# Patient Record
Sex: FEMALE | Race: WHITE | NOT HISPANIC OR LATINO | URBAN - METROPOLITAN AREA
[De-identification: names, ages, dates, MRNs, and addresses within clinical notes are randomized per-mention and may not be internally consistent; named-entity substitution may affect disease eponyms.]

---

## 2017-05-17 ENCOUNTER — APPOINTMENT (RX ONLY)
Dept: URBAN - METROPOLITAN AREA CLINIC 24 | Facility: CLINIC | Age: 52
Setting detail: DERMATOLOGY
End: 2017-05-17

## 2017-05-17 DIAGNOSIS — Z87.2 PERSONAL HISTORY OF DISEASES OF THE SKIN AND SUBCUTANEOUS TISSUE: ICD-10-CM

## 2017-05-17 DIAGNOSIS — L92.0 GRANULOMA ANNULARE: ICD-10-CM
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
## 2017-05-17 DIAGNOSIS — D22 MELANOCYTIC NEVI: ICD-10-CM

## 2017-05-17 PROBLEM — D22.72 MELANOCYTIC NEVI OF LEFT LOWER LIMB, INCLUDING HIP: Status: ACTIVE | Noted: 2017-05-17

## 2017-05-17 PROBLEM — D22.71 MELANOCYTIC NEVI OF RIGHT LOWER LIMB, INCLUDING HIP: Status: ACTIVE | Noted: 2017-05-17

## 2017-05-17 PROBLEM — D22.5 MELANOCYTIC NEVI OF TRUNK: Status: ACTIVE | Noted: 2017-05-17

## 2017-05-17 PROBLEM — D22.61 MELANOCYTIC NEVI OF RIGHT UPPER LIMB, INCLUDING SHOULDER: Status: ACTIVE | Noted: 2017-05-17

## 2017-05-17 PROBLEM — D22.4 MELANOCYTIC NEVI OF SCALP AND NECK: Status: ACTIVE | Noted: 2017-05-17
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
## 2017-05-17 PROBLEM — L55.1 SUNBURN OF SECOND DEGREE: Status: ACTIVE | Noted: 2017-05-17

## 2017-05-17 PROBLEM — D22.62 MELANOCYTIC NEVI OF LEFT UPPER LIMB, INCLUDING SHOULDER: Status: ACTIVE | Noted: 2017-05-17

## 2017-05-17 PROCEDURE — ? OTHER

## 2017-05-17 PROCEDURE — ? PRESCRIPTION

## 2017-05-17 PROCEDURE — 99214 OFFICE O/P EST MOD 30 MIN: CPT

## 2017-05-17 PROCEDURE — ? COUNSELING

## 2017-05-17 PROCEDURE — ? TREATMENT REGIMEN

## 2017-05-17 PROCEDURE — ? DEFER

## 2017-05-17 RX ORDER — CLOBETASOL PROPIONATE 0.5 MG/G
OINTMENT TOPICAL
Qty: 1 | Refills: 2 | Status: CANCELLED
Stop reason: ENTERED-IN-ERROR

## 2017-05-17 ASSESSMENT — LOCATION ZONE DERM
LOCATION ZONE: LEG
LOCATION ZONE: TRUNK
LOCATION ZONE: ARM
LOCATION ZONE: HAND
LOCATION ZONE: NECK
LOCATION ZONE: FINGER

## 2017-05-17 ASSESSMENT — LOCATION DETAILED DESCRIPTION DERM
LOCATION DETAILED: RIGHT INFERIOR LATERAL NECK
LOCATION DETAILED: LEFT MID-UPPER BACK
LOCATION DETAILED: RIGHT MEDIAL BREAST 2-3:00 REGION
LOCATION DETAILED: LEFT MEDIAL SUPERIOR CHEST
LOCATION DETAILED: RIGHT INDEX PROXIMAL INTERPHALANGEAL JOINT
LOCATION DETAILED: RIGHT DORSAL INDEX FINGER METACARPOPHALANGEAL JOINT
LOCATION DETAILED: LEFT POSTERIOR SHOULDER
LOCATION DETAILED: RIGHT MEDIAL SUPERIOR CHEST
LOCATION DETAILED: LEFT MEDIAL UPPER BACK
LOCATION DETAILED: RIGHT SUPERIOR LATERAL UPPER BACK
LOCATION DETAILED: RIGHT RADIAL DORSAL HAND
LOCATION DETAILED: LEFT ANTERIOR PROXIMAL UPPER ARM
LOCATION DETAILED: RIGHT ANTERIOR PROXIMAL THIGH
LOCATION DETAILED: RIGHT ANTERIOR DISTAL THIGH
LOCATION DETAILED: RIGHT PROXIMAL DORSAL MIDDLE FINGER
LOCATION DETAILED: LEFT DISTAL LATERAL POSTERIOR THIGH
LOCATION DETAILED: RIGHT ANTERIOR PROXIMAL UPPER ARM

## 2017-05-17 ASSESSMENT — LOCATION SIMPLE DESCRIPTION DERM
LOCATION SIMPLE: RIGHT ANTERIOR NECK
LOCATION SIMPLE: LEFT UPPER ARM
LOCATION SIMPLE: RIGHT INDEX FINGER
LOCATION SIMPLE: RIGHT HAND
LOCATION SIMPLE: RIGHT BREAST
LOCATION SIMPLE: LEFT SHOULDER
LOCATION SIMPLE: LEFT POSTERIOR THIGH
LOCATION SIMPLE: LEFT UPPER BACK
LOCATION SIMPLE: RIGHT BACK
LOCATION SIMPLE: RIGHT UPPER ARM
LOCATION SIMPLE: CHEST
LOCATION SIMPLE: RIGHT THIGH
LOCATION SIMPLE: RIGHT MIDDLE FINGER

## 2017-05-17 NOTE — PROCEDURE: TREATMENT REGIMEN
Plan: Call if she wants to pursue biopsy to confirm diagnosis or ILK for treatment
Initiate Treatment: Clobetasol ointment bid for 3 weeks
Detail Level: Zone

## 2017-05-17 NOTE — PROCEDURE: DEFER
Instructions (Optional): Will wait until after her daughters wedding
Procedure To Be Performed At Next Visit: Biopsy by shave method
Detail Level: Detailed

## 2017-05-17 NOTE — PROCEDURE: OTHER
Detail Level: Zone
Note Text (......Xxx Chief Complaint.): This diagnosis correlates with the
Other (Free Text): May appear more red due to burn from tanning bed. Will recheck after wedding and plan to biopsy if still concerning

## 2017-08-24 ENCOUNTER — APPOINTMENT (RX ONLY)
Dept: URBAN - METROPOLITAN AREA CLINIC 24 | Facility: CLINIC | Age: 52
Setting detail: DERMATOLOGY
End: 2017-08-24

## 2017-08-24 DIAGNOSIS — D22 MELANOCYTIC NEVI: ICD-10-CM

## 2017-08-24 DIAGNOSIS — L92.0 GRANULOMA ANNULARE: ICD-10-CM | Status: STABLE

## 2017-08-24 PROBLEM — E78.5 HYPERLIPIDEMIA, UNSPECIFIED: Status: ACTIVE | Noted: 2017-08-24

## 2017-08-24 PROBLEM — D22.5 MELANOCYTIC NEVI OF TRUNK: Status: ACTIVE | Noted: 2017-08-24

## 2017-08-24 PROCEDURE — 11300 SHAVE SKIN LESION 0.5 CM/<: CPT

## 2017-08-24 PROCEDURE — ? COUNSELING

## 2017-08-24 PROCEDURE — ? TREATMENT REGIMEN

## 2017-08-24 PROCEDURE — ? SHAVE REMOVAL

## 2017-08-24 PROCEDURE — 99212 OFFICE O/P EST SF 10 MIN: CPT | Mod: 25

## 2017-08-24 ASSESSMENT — LOCATION DETAILED DESCRIPTION DERM
LOCATION DETAILED: RIGHT DORSAL INDEX FINGER METACARPOPHALANGEAL JOINT
LOCATION DETAILED: LEFT MEDIAL SUPERIOR CHEST
LOCATION DETAILED: RIGHT RADIAL DORSAL HAND
LOCATION DETAILED: RIGHT PROXIMAL DORSAL MIDDLE FINGER
LOCATION DETAILED: RIGHT INDEX PROXIMAL INTERPHALANGEAL JOINT

## 2017-08-24 ASSESSMENT — LOCATION SIMPLE DESCRIPTION DERM
LOCATION SIMPLE: RIGHT INDEX FINGER
LOCATION SIMPLE: RIGHT HAND
LOCATION SIMPLE: CHEST
LOCATION SIMPLE: RIGHT MIDDLE FINGER

## 2017-08-24 ASSESSMENT — LOCATION ZONE DERM
LOCATION ZONE: HAND
LOCATION ZONE: FINGER
LOCATION ZONE: TRUNK

## 2017-08-24 NOTE — PROCEDURE: TREATMENT REGIMEN
Detail Level: Zone
Plan: Discussed this is probable diagnosis. Call if she wants to pursue biopsy to confirm diagnosis, ILK for treatment, or systemic treatment. She has declined these options for now
Continue Regimen: Clobetasol ointment bid for 3 weeks

## 2017-08-24 NOTE — PROCEDURE: SHAVE REMOVAL
Detail Level: Detailed
Anesthesia Volume In Cc: 0.5
Notification Instructions: Patient will be notified of biopsy results. However, patient instructed to call the office if not contacted within 2 weeks.
Size Of Lesion In Cm (Required): 0.4
Size Of Margin In Cm (Margins Are Not Added To Billing Dimensions): -
Biopsy Method: Personna blade
Post-Care Instructions: I reviewed with the patient in detail post-care instructions. Patient is to keep the biopsy site dry overnight, and then apply vaseline twice daily until healed. Patient may apply hydrogen peroxide soaks to remove any crusting. Wound care sheet provided.
Path Notes (To The Dermatopathologist): Please check margins.
Render Post-Care Instructions In Note?: yes
Wound Care: Vaseline
Anesthesia Type: 1% lidocaine without epinephrine and a 1:10 solution of 8.4% sodium bicarbonate
Accession #: [facility]
Medical Necessity Clause: This procedure was medically necessary because the lesion that was rubs on shirt, scarf, and irritated.
X Size Of Lesion In Cm (Optional): 0
Medical Necessity Information: It is in your best interest to select a reason for this procedure from the list below. All of these items fulfill various CMS LCD requirements except the new and changing color options.
Billing Type: Third-Party Bill
Bill 24323 For Specimen Handling/Conveyance To Laboratory?: no
Consent was obtained from the patient. The risks and benefits to therapy were discussed in detail. Specifically, the risks of infection, scarring, bleeding, prolonged wound healing, incomplete removal, allergy to anesthesia, nerve injury and recurrence were addressed. Prior to the procedure, the treatment site was clearly identified and confirmed by the patient. All components of Universal Protocol/PAUSE Rule completed.
Hemostasis: Aluminum Chloride

## 2017-09-14 ENCOUNTER — HOSPITAL ENCOUNTER (OUTPATIENT)
Dept: MAMMOGRAPHY | Age: 52
Discharge: HOME | End: 2017-09-14
Attending: INTERNAL MEDICINE

## 2017-09-14 DIAGNOSIS — Z12.31 VISIT FOR SCREENING MAMMOGRAM: ICD-10-CM

## 2017-09-14 PROCEDURE — 77067 SCR MAMMO BI INCL CAD: CPT

## 2018-01-14 ENCOUNTER — HOSPITAL ENCOUNTER (EMERGENCY)
Age: 53
Discharge: HOME | End: 2018-01-14
Attending: EMERGENCY MEDICINE
Payer: COMMERCIAL

## 2018-01-14 ENCOUNTER — APPOINTMENT (OUTPATIENT)
Dept: GENERAL RADIOLOGY | Age: 53
End: 2018-01-14
Attending: STUDENT IN AN ORGANIZED HEALTH CARE EDUCATION/TRAINING PROGRAM
Payer: COMMERCIAL

## 2018-01-14 VITALS
HEIGHT: 66 IN | HEART RATE: 82 BPM | SYSTOLIC BLOOD PRESSURE: 181 MMHG | OXYGEN SATURATION: 99 % | TEMPERATURE: 97.9 F | RESPIRATION RATE: 18 BRPM | DIASTOLIC BLOOD PRESSURE: 97 MMHG | WEIGHT: 194 LBS | BODY MASS INDEX: 31.18 KG/M2

## 2018-01-14 DIAGNOSIS — R07.9 CHEST PAIN, UNSPECIFIED TYPE: Primary | ICD-10-CM

## 2018-01-14 LAB
ALBUMIN SERPL-MCNC: 3.5 G/DL (ref 3.5–5)
ALBUMIN/GLOB SERPL: 1 {RATIO} (ref 1.2–3.5)
ALP SERPL-CCNC: 102 U/L (ref 50–136)
ALT SERPL-CCNC: 31 U/L (ref 12–65)
ANION GAP SERPL CALC-SCNC: 9 MMOL/L (ref 7–16)
AST SERPL-CCNC: 17 U/L (ref 15–37)
BASOPHILS # BLD: 0.1 K/UL (ref 0–0.2)
BASOPHILS NFR BLD: 1 % (ref 0–2)
BILIRUB SERPL-MCNC: 0.3 MG/DL (ref 0.2–1.1)
BUN SERPL-MCNC: 19 MG/DL (ref 6–23)
CALCIUM SERPL-MCNC: 8.7 MG/DL (ref 8.3–10.4)
CHLORIDE SERPL-SCNC: 103 MMOL/L (ref 98–107)
CO2 SERPL-SCNC: 28 MMOL/L (ref 21–32)
CREAT SERPL-MCNC: 0.6 MG/DL (ref 0.6–1)
DIFFERENTIAL METHOD BLD: ABNORMAL
EOSINOPHIL # BLD: 0.3 K/UL (ref 0–0.8)
EOSINOPHIL NFR BLD: 3 % (ref 0.5–7.8)
ERYTHROCYTE [DISTWIDTH] IN BLOOD BY AUTOMATED COUNT: 13.3 % (ref 11.9–14.6)
GLOBULIN SER CALC-MCNC: 3.5 G/DL (ref 2.3–3.5)
GLUCOSE SERPL-MCNC: 83 MG/DL (ref 65–100)
HCT VFR BLD AUTO: 42.2 % (ref 35.8–46.3)
HGB BLD-MCNC: 14.1 G/DL (ref 11.7–15.4)
IMM GRANULOCYTES # BLD: 0 K/UL (ref 0–0.5)
IMM GRANULOCYTES NFR BLD AUTO: 0 % (ref 0–5)
LIPASE SERPL-CCNC: 157 U/L (ref 73–393)
LYMPHOCYTES # BLD: 2.5 K/UL (ref 0.5–4.6)
LYMPHOCYTES NFR BLD: 25 % (ref 13–44)
MCH RBC QN AUTO: 31 PG (ref 26.1–32.9)
MCHC RBC AUTO-ENTMCNC: 33.4 G/DL (ref 31.4–35)
MCV RBC AUTO: 92.7 FL (ref 79.6–97.8)
MONOCYTES # BLD: 1 K/UL (ref 0.1–1.3)
MONOCYTES NFR BLD: 10 % (ref 4–12)
NEUTS SEG # BLD: 6.1 K/UL (ref 1.7–8.2)
NEUTS SEG NFR BLD: 61 % (ref 43–78)
PLATELET # BLD AUTO: 315 K/UL (ref 150–450)
PMV BLD AUTO: 10.2 FL (ref 10.8–14.1)
POTASSIUM SERPL-SCNC: 3.8 MMOL/L (ref 3.5–5.1)
PROT SERPL-MCNC: 7 G/DL (ref 6.3–8.2)
RBC # BLD AUTO: 4.55 M/UL (ref 4.05–5.25)
SODIUM SERPL-SCNC: 140 MMOL/L (ref 136–145)
TROPONIN I BLD-MCNC: 0 NG/ML (ref 0.02–0.05)
TROPONIN I SERPL-MCNC: <0.02 NG/ML (ref 0.02–0.05)
WBC # BLD AUTO: 9.9 K/UL (ref 4.3–11.1)

## 2018-01-14 PROCEDURE — 93005 ELECTROCARDIOGRAM TRACING: CPT | Performed by: STUDENT IN AN ORGANIZED HEALTH CARE EDUCATION/TRAINING PROGRAM

## 2018-01-14 PROCEDURE — 96375 TX/PRO/DX INJ NEW DRUG ADDON: CPT | Performed by: EMERGENCY MEDICINE

## 2018-01-14 PROCEDURE — 83690 ASSAY OF LIPASE: CPT | Performed by: EMERGENCY MEDICINE

## 2018-01-14 PROCEDURE — 71046 X-RAY EXAM CHEST 2 VIEWS: CPT

## 2018-01-14 PROCEDURE — 84484 ASSAY OF TROPONIN QUANT: CPT | Performed by: STUDENT IN AN ORGANIZED HEALTH CARE EDUCATION/TRAINING PROGRAM

## 2018-01-14 PROCEDURE — 74011250637 HC RX REV CODE- 250/637: Performed by: EMERGENCY MEDICINE

## 2018-01-14 PROCEDURE — 96374 THER/PROPH/DIAG INJ IV PUSH: CPT | Performed by: EMERGENCY MEDICINE

## 2018-01-14 PROCEDURE — 85025 COMPLETE CBC W/AUTO DIFF WBC: CPT | Performed by: STUDENT IN AN ORGANIZED HEALTH CARE EDUCATION/TRAINING PROGRAM

## 2018-01-14 PROCEDURE — 80053 COMPREHEN METABOLIC PANEL: CPT | Performed by: STUDENT IN AN ORGANIZED HEALTH CARE EDUCATION/TRAINING PROGRAM

## 2018-01-14 PROCEDURE — 74011250636 HC RX REV CODE- 250/636: Performed by: EMERGENCY MEDICINE

## 2018-01-14 PROCEDURE — 99284 EMERGENCY DEPT VISIT MOD MDM: CPT | Performed by: EMERGENCY MEDICINE

## 2018-01-14 RX ORDER — MORPHINE SULFATE 2 MG/ML
2 INJECTION, SOLUTION INTRAMUSCULAR; INTRAVENOUS
Status: COMPLETED | OUTPATIENT
Start: 2018-01-14 | End: 2018-01-14

## 2018-01-14 RX ORDER — NITROGLYCERIN 0.4 MG/1
0.4 TABLET SUBLINGUAL
Status: DISCONTINUED | OUTPATIENT
Start: 2018-01-14 | End: 2018-01-14 | Stop reason: HOSPADM

## 2018-01-14 RX ORDER — MELOXICAM 15 MG/1
15 TABLET ORAL DAILY
Qty: 10 TAB | Refills: 0 | Status: SHIPPED | OUTPATIENT
Start: 2018-01-14 | End: 2018-01-22

## 2018-01-14 RX ORDER — ONDANSETRON 2 MG/ML
4 INJECTION INTRAMUSCULAR; INTRAVENOUS
Status: COMPLETED | OUTPATIENT
Start: 2018-01-14 | End: 2018-01-14

## 2018-01-14 RX ORDER — GUAIFENESIN 100 MG/5ML
324 LIQUID (ML) ORAL
Status: COMPLETED | OUTPATIENT
Start: 2018-01-14 | End: 2018-01-14

## 2018-01-14 RX ADMIN — ASPIRIN 81 MG 324 MG: 81 TABLET ORAL at 17:25

## 2018-01-14 RX ADMIN — NITROGLYCERIN 0.4 MG: 0.4 TABLET SUBLINGUAL at 17:25

## 2018-01-14 RX ADMIN — ONDANSETRON 4 MG: 2 INJECTION INTRAMUSCULAR; INTRAVENOUS at 17:25

## 2018-01-14 RX ADMIN — MORPHINE SULFATE 2 MG: 2 INJECTION, SOLUTION INTRAMUSCULAR; INTRAVENOUS at 19:49

## 2018-01-14 NOTE — ED PROVIDER NOTES
HPI Comments: Presents with complaint of left-sided chest pain into her left arm and fingers feel numb surgery while she was unloading groceries. Patient reports it feels better if she placed her arm across her chest.  She denies any shortness of breath or vomiting but reports some nausea. She's never had this before but has had left shoulder pain. Patient is a 50 y.o. female presenting with chest pain. The history is provided by the patient. Chest Pain (Angina)    This is a new problem. The current episode started 3 to 5 hours ago. The problem has not changed since onset. The pain is associated with normal activity. The pain is present in the left side. The quality of the pain is described as sharp and stabbing. The pain radiates to the left arm. Associated symptoms include nausea. Pertinent negatives include no diaphoresis, no leg pain, no lower extremity edema, no shortness of breath and no vomiting. She has tried nothing for the symptoms. Risk factors include hypertension, dyslipidemia and obesity. Pertinent negatives include no cardiac catheterization and no cardiac stents. Past Medical History:   Diagnosis Date    Elevated cholesterol     Hypertension        Past Surgical History:   Procedure Laterality Date    HX BREAST BIOPSY Bilateral     HX CHOLECYSTECTOMY      HX GYN       x 2    HX OTHER SURGICAL      partial hysterectomy         Family History:   Problem Relation Age of Onset    Family history unknown: Yes       Social History     Social History    Marital status:      Spouse name: N/A    Number of children: N/A    Years of education: N/A     Occupational History    Not on file.      Social History Main Topics    Smoking status: Never Smoker    Smokeless tobacco: Not on file    Alcohol use No    Drug use: No    Sexual activity: No     Other Topics Concern    Not on file     Social History Narrative         ALLERGIES: Codeine    Review of Systems Constitutional: Negative for diaphoresis. Respiratory: Negative for shortness of breath. Cardiovascular: Positive for chest pain. Gastrointestinal: Positive for nausea. Negative for vomiting. All other systems reviewed and are negative. Vitals:    01/14/18 1606 01/14/18 1725 01/14/18 1730   BP: (!) 186/114 (!) 167/98 (!) 154/95   Pulse: 73 74 74   Resp: 18     Temp: 97.9 °F (36.6 °C)     SpO2: 99%  99%   Weight: 88 kg (194 lb)     Height: 5' 6\" (1.676 m)              Physical Exam   Constitutional: She is oriented to person, place, and time. She appears well-developed and well-nourished. No distress. HENT:   Head: Normocephalic and atraumatic. Neck: Normal range of motion. Neck supple. Cardiovascular: Normal rate and regular rhythm. Pulmonary/Chest: Effort normal. No respiratory distress. She has no wheezes. She has no rales. She exhibits no tenderness. Abdominal: Soft. She exhibits no distension. There is no tenderness. There is no rebound and no guarding. Musculoskeletal: Normal range of motion. She exhibits no edema, tenderness or deformity. Neurological: She is alert and oriented to person, place, and time. No cranial nerve deficit. Skin: Skin is warm and dry. She is not diaphoretic. Psychiatric: She has a normal mood and affect. Her behavior is normal.   Nursing note and vitals reviewed. MDM  Number of Diagnoses or Management Options  Chest pain, unspecified type:   Diagnosis management comments: Low risk for cardiac disease (heart score 3) PERC neg. D/w to f/u with cards.          Amount and/or Complexity of Data Reviewed  Clinical lab tests: ordered and reviewed  Review and summarize past medical records: yes  Independent visualization of images, tracings, or specimens: yes    Risk of Complications, Morbidity, and/or Mortality  Presenting problems: high  Diagnostic procedures: moderate  Management options: moderate    Patient Progress  Patient progress: improved    ED Course       Procedures

## 2018-01-15 LAB
ATRIAL RATE: 73 BPM
CALCULATED P AXIS, ECG09: 46 DEGREES
CALCULATED R AXIS, ECG10: 69 DEGREES
CALCULATED T AXIS, ECG11: 80 DEGREES
DIAGNOSIS, 93000: NORMAL
P-R INTERVAL, ECG05: 132 MS
Q-T INTERVAL, ECG07: 404 MS
QRS DURATION, ECG06: 78 MS
QTC CALCULATION (BEZET), ECG08: 445 MS
VENTRICULAR RATE, ECG03: 73 BPM

## 2018-01-15 NOTE — DISCHARGE INSTRUCTIONS
Chest Pain: Care Instructions  Your Care Instructions    There are many things that can cause chest pain. Some are not serious and will get better on their own in a few days. But some kinds of chest pain need more testing and treatment. Your doctor may have recommended a follow-up visit in the next 8 to 12 hours. If you are not getting better, you may need more tests or treatment. Even though your doctor has released you, you still need to watch for any problems. The doctor carefully checked you, but sometimes problems can develop later. If you have new symptoms or if your symptoms do not get better, get medical care right away. If you have worse or different chest pain or pressure that lasts more than 5 minutes or you passed out (lost consciousness), call 911 or seek other emergency help right away. A medical visit is only one step in your treatment. Even if you feel better, you still need to do what your doctor recommends, such as going to all suggested follow-up appointments and taking medicines exactly as directed. This will help you recover and help prevent future problems. How can you care for yourself at home? · Rest until you feel better. · Take your medicine exactly as prescribed. Call your doctor if you think you are having a problem with your medicine. · Do not drive after taking a prescription pain medicine. When should you call for help? Call 911 if:  ? · You passed out (lost consciousness). ? · You have severe difficulty breathing. ? · You have symptoms of a heart attack. These may include:  ¨ Chest pain or pressure, or a strange feeling in your chest.  ¨ Sweating. ¨ Shortness of breath. ¨ Nausea or vomiting. ¨ Pain, pressure, or a strange feeling in your back, neck, jaw, or upper belly or in one or both shoulders or arms. ¨ Lightheadedness or sudden weakness. ¨ A fast or irregular heartbeat.   After you call 911, the  may tell you to chew 1 adult-strength or 2 to 4 low-dose aspirin. Wait for an ambulance. Do not try to drive yourself. ?Call your doctor today if:  ? · You have any trouble breathing. ? · Your chest pain gets worse. ? · You are dizzy or lightheaded, or you feel like you may faint. ? · You are not getting better as expected. ? · You are having new or different chest pain. Where can you learn more? Go to http://earnest-buzz.info/. Enter A120 in the search box to learn more about \"Chest Pain: Care Instructions. \"  Current as of: March 20, 2017  Content Version: 11.4  © 5658-8963 [employer]. Care instructions adapted under license by [employer] (which disclaims liability or warranty for this information). If you have questions about a medical condition or this instruction, always ask your healthcare professional. [address] any warranty or liability for your use of this information.

## 2018-01-22 PROBLEM — R07.2 PRECORDIAL PAIN: Status: ACTIVE | Noted: 2018-01-22

## 2018-01-22 PROBLEM — I10 ESSENTIAL HYPERTENSION: Status: ACTIVE | Noted: 2018-01-22

## 2018-01-22 PROBLEM — E78.00 PURE HYPERCHOLESTEROLEMIA: Status: ACTIVE | Noted: 2018-01-22

## 2018-01-30 ENCOUNTER — HOSPITAL ENCOUNTER (OUTPATIENT)
Dept: CT IMAGING | Age: 53
Discharge: HOME | End: 2018-01-30
Attending: INTERNAL MEDICINE
Payer: SELF-PAY

## 2018-01-30 DIAGNOSIS — R07.2 PRECORDIAL PAIN: ICD-10-CM

## 2018-01-30 PROCEDURE — 75571 CT HRT W/O DYE W/CA TEST: CPT

## 2018-07-16 ENCOUNTER — HOSPITAL ENCOUNTER (OUTPATIENT)
Dept: LAB | Age: 53
Discharge: HOME | End: 2018-07-16

## 2018-07-16 PROCEDURE — 88305 TISSUE EXAM BY PATHOLOGIST: CPT | Performed by: INTERNAL MEDICINE

## 2018-12-03 ENCOUNTER — HOSPITAL ENCOUNTER (OUTPATIENT)
Dept: MAMMOGRAPHY | Age: 53
Discharge: HOME | End: 2018-12-03
Attending: INTERNAL MEDICINE

## 2018-12-03 DIAGNOSIS — Z12.31 VISIT FOR SCREENING MAMMOGRAM: ICD-10-CM

## 2018-12-03 PROCEDURE — 77067 SCR MAMMO BI INCL CAD: CPT

## 2019-06-10 ENCOUNTER — HOSPITAL ENCOUNTER (EMERGENCY)
Age: 54
Discharge: ANOTHER HOSPITAL | End: 2019-06-10
Attending: EMERGENCY MEDICINE
Payer: COMMERCIAL

## 2019-06-10 ENCOUNTER — APPOINTMENT (OUTPATIENT)
Dept: CT IMAGING | Age: 54
End: 2019-06-10
Attending: EMERGENCY MEDICINE
Payer: COMMERCIAL

## 2019-06-10 ENCOUNTER — APPOINTMENT (OUTPATIENT)
Dept: GENERAL RADIOLOGY | Age: 54
End: 2019-06-10
Attending: STUDENT IN AN ORGANIZED HEALTH CARE EDUCATION/TRAINING PROGRAM
Payer: COMMERCIAL

## 2019-06-10 ENCOUNTER — HOSPITAL ENCOUNTER (OUTPATIENT)
Age: 54
Setting detail: OBSERVATION
Discharge: HOME | End: 2019-06-11
Attending: INTERNAL MEDICINE | Admitting: INTERNAL MEDICINE
Payer: COMMERCIAL

## 2019-06-10 VITALS
HEIGHT: 65 IN | HEART RATE: 77 BPM | SYSTOLIC BLOOD PRESSURE: 175 MMHG | OXYGEN SATURATION: 99 % | BODY MASS INDEX: 34.66 KG/M2 | RESPIRATION RATE: 16 BRPM | TEMPERATURE: 98.5 F | DIASTOLIC BLOOD PRESSURE: 94 MMHG | WEIGHT: 208 LBS

## 2019-06-10 DIAGNOSIS — R07.9 CHEST PAIN, UNSPECIFIED TYPE: Primary | ICD-10-CM

## 2019-06-10 LAB
ALBUMIN SERPL-MCNC: 3.7 G/DL (ref 3.5–5)
ALBUMIN/GLOB SERPL: 1.1 {RATIO} (ref 1.2–3.5)
ALP SERPL-CCNC: 89 U/L (ref 50–136)
ALT SERPL-CCNC: 50 U/L (ref 12–65)
ANION GAP SERPL CALC-SCNC: 10 MMOL/L (ref 7–16)
AST SERPL-CCNC: 29 U/L (ref 15–37)
ATRIAL RATE: 82 BPM
BASOPHILS # BLD: 0 K/UL (ref 0–0.2)
BASOPHILS NFR BLD: 0 % (ref 0–2)
BILIRUB SERPL-MCNC: 0.8 MG/DL (ref 0.2–1.1)
BUN SERPL-MCNC: 16 MG/DL (ref 6–23)
CALCIUM SERPL-MCNC: 8.7 MG/DL (ref 8.3–10.4)
CALCULATED P AXIS, ECG09: 62 DEGREES
CALCULATED R AXIS, ECG10: 63 DEGREES
CALCULATED T AXIS, ECG11: 80 DEGREES
CHLORIDE SERPL-SCNC: 95 MMOL/L (ref 98–107)
CO2 SERPL-SCNC: 27 MMOL/L (ref 21–32)
CREAT SERPL-MCNC: 0.61 MG/DL (ref 0.6–1)
D DIMER PPP FEU-MCNC: 0.59 UG/ML(FEU)
DIAGNOSIS, 93000: NORMAL
DIFFERENTIAL METHOD BLD: NORMAL
EOSINOPHIL # BLD: 0.2 K/UL (ref 0–0.8)
EOSINOPHIL NFR BLD: 2 % (ref 0.5–7.8)
ERYTHROCYTE [DISTWIDTH] IN BLOOD BY AUTOMATED COUNT: 13.2 % (ref 11.9–14.6)
GLOBULIN SER CALC-MCNC: 3.5 G/DL (ref 2.3–3.5)
GLUCOSE SERPL-MCNC: 99 MG/DL (ref 65–100)
HCT VFR BLD AUTO: 41.8 % (ref 35.8–46.3)
HGB BLD-MCNC: 14.2 G/DL (ref 11.7–15.4)
IMM GRANULOCYTES # BLD AUTO: 0 K/UL (ref 0–0.5)
IMM GRANULOCYTES NFR BLD AUTO: 0 % (ref 0–5)
LYMPHOCYTES # BLD: 1.3 K/UL (ref 0.5–4.6)
LYMPHOCYTES NFR BLD: 17 % (ref 13–44)
MCH RBC QN AUTO: 29.9 PG (ref 26.1–32.9)
MCHC RBC AUTO-ENTMCNC: 34 G/DL (ref 31.4–35)
MCV RBC AUTO: 88 FL (ref 79.6–97.8)
MONOCYTES # BLD: 0.7 K/UL (ref 0.1–1.3)
MONOCYTES NFR BLD: 9 % (ref 4–12)
NEUTS SEG # BLD: 5.3 K/UL (ref 1.7–8.2)
NEUTS SEG NFR BLD: 71 % (ref 43–78)
NRBC # BLD: 0 K/UL (ref 0–0.2)
P-R INTERVAL, ECG05: 128 MS
PLATELET # BLD AUTO: 388 K/UL (ref 150–450)
PMV BLD AUTO: 10.2 FL (ref 9.4–12.3)
POTASSIUM SERPL-SCNC: 2.8 MMOL/L (ref 3.5–5.1)
PROT SERPL-MCNC: 7.2 G/DL (ref 6.3–8.2)
Q-T INTERVAL, ECG07: 402 MS
QRS DURATION, ECG06: 82 MS
QTC CALCULATION (BEZET), ECG08: 469 MS
RBC # BLD AUTO: 4.75 M/UL (ref 4.05–5.2)
SODIUM SERPL-SCNC: 132 MMOL/L (ref 136–145)
TROPONIN I BLD-MCNC: 0 NG/ML (ref 0.02–0.05)
TROPONIN I SERPL-MCNC: <0.02 NG/ML (ref 0.02–0.05)
VENTRICULAR RATE, ECG03: 82 BPM
WBC # BLD AUTO: 7.6 K/UL (ref 4.3–11.1)

## 2019-06-10 PROCEDURE — 84484 ASSAY OF TROPONIN QUANT: CPT

## 2019-06-10 PROCEDURE — 81003 URINALYSIS AUTO W/O SCOPE: CPT | Performed by: EMERGENCY MEDICINE

## 2019-06-10 PROCEDURE — 80053 COMPREHEN METABOLIC PANEL: CPT

## 2019-06-10 PROCEDURE — 93005 ELECTROCARDIOGRAM TRACING: CPT | Performed by: STUDENT IN AN ORGANIZED HEALTH CARE EDUCATION/TRAINING PROGRAM

## 2019-06-10 PROCEDURE — 36415 COLL VENOUS BLD VENIPUNCTURE: CPT

## 2019-06-10 PROCEDURE — 74011636320 HC RX REV CODE- 636/320: Performed by: EMERGENCY MEDICINE

## 2019-06-10 PROCEDURE — 99285 EMERGENCY DEPT VISIT HI MDM: CPT | Performed by: EMERGENCY MEDICINE

## 2019-06-10 PROCEDURE — 71046 X-RAY EXAM CHEST 2 VIEWS: CPT

## 2019-06-10 PROCEDURE — 93005 ELECTROCARDIOGRAM TRACING: CPT | Performed by: EMERGENCY MEDICINE

## 2019-06-10 PROCEDURE — 71260 CT THORAX DX C+: CPT

## 2019-06-10 PROCEDURE — 85025 COMPLETE CBC W/AUTO DIFF WBC: CPT

## 2019-06-10 PROCEDURE — 85379 FIBRIN DEGRADATION QUANT: CPT

## 2019-06-10 PROCEDURE — 74011250637 HC RX REV CODE- 250/637: Performed by: EMERGENCY MEDICINE

## 2019-06-10 PROCEDURE — 74011000258 HC RX REV CODE- 258: Performed by: EMERGENCY MEDICINE

## 2019-06-10 PROCEDURE — 99218 HC RM OBSERVATION: CPT

## 2019-06-10 PROCEDURE — 65660000000 HC RM CCU STEPDOWN

## 2019-06-10 RX ORDER — DULOXETIN HYDROCHLORIDE 30 MG/1
30 CAPSULE, DELAYED RELEASE ORAL DAILY
Status: DISCONTINUED | OUTPATIENT
Start: 2019-06-11 | End: 2019-06-11 | Stop reason: HOSPADM

## 2019-06-10 RX ORDER — GUAIFENESIN 100 MG/5ML
81 LIQUID (ML) ORAL DAILY
Status: DISCONTINUED | OUTPATIENT
Start: 2019-06-11 | End: 2019-06-11 | Stop reason: HOSPADM

## 2019-06-10 RX ORDER — LEVOTHYROXINE SODIUM 75 UG/1
150 TABLET ORAL
Status: DISCONTINUED | OUTPATIENT
Start: 2019-06-11 | End: 2019-06-10

## 2019-06-10 RX ORDER — SODIUM CHLORIDE 0.9 % (FLUSH) 0.9 %
5-40 SYRINGE (ML) INJECTION AS NEEDED
Status: DISCONTINUED | OUTPATIENT
Start: 2019-06-10 | End: 2019-06-11 | Stop reason: HOSPADM

## 2019-06-10 RX ORDER — POTASSIUM CHLORIDE 20 MEQ/1
40 TABLET, EXTENDED RELEASE ORAL
Status: COMPLETED | OUTPATIENT
Start: 2019-06-10 | End: 2019-06-10

## 2019-06-10 RX ORDER — MONTELUKAST SODIUM 10 MG/1
10 TABLET ORAL
Status: DISCONTINUED | OUTPATIENT
Start: 2019-06-10 | End: 2019-06-11 | Stop reason: HOSPADM

## 2019-06-10 RX ORDER — ATORVASTATIN CALCIUM 40 MG/1
40 TABLET, FILM COATED ORAL EVERY EVENING
Status: DISCONTINUED | OUTPATIENT
Start: 2019-06-11 | End: 2019-06-11 | Stop reason: HOSPADM

## 2019-06-10 RX ORDER — SODIUM CHLORIDE 0.9 % (FLUSH) 0.9 %
10 SYRINGE (ML) INJECTION
Status: COMPLETED | OUTPATIENT
Start: 2019-06-10 | End: 2019-06-10

## 2019-06-10 RX ORDER — SODIUM CHLORIDE 9 MG/ML
100 INJECTION, SOLUTION INTRAVENOUS CONTINUOUS
Status: DISCONTINUED | OUTPATIENT
Start: 2019-06-11 | End: 2019-06-11 | Stop reason: HOSPADM

## 2019-06-10 RX ORDER — ATORVASTATIN CALCIUM 40 MG/1
40 TABLET, FILM COATED ORAL
Status: DISCONTINUED | OUTPATIENT
Start: 2019-06-10 | End: 2019-06-10

## 2019-06-10 RX ORDER — NITROGLYCERIN 0.4 MG/1
0.4 TABLET SUBLINGUAL
Status: DISCONTINUED | OUTPATIENT
Start: 2019-06-10 | End: 2019-06-11 | Stop reason: HOSPADM

## 2019-06-10 RX ORDER — HYDROCODONE BITARTRATE AND ACETAMINOPHEN 7.5; 325 MG/1; MG/1
1 TABLET ORAL
Status: DISCONTINUED | OUTPATIENT
Start: 2019-06-10 | End: 2019-06-11 | Stop reason: HOSPADM

## 2019-06-10 RX ORDER — ACETAMINOPHEN 325 MG/1
650 TABLET ORAL
Status: DISCONTINUED | OUTPATIENT
Start: 2019-06-10 | End: 2019-06-11 | Stop reason: HOSPADM

## 2019-06-10 RX ORDER — LEVOTHYROXINE SODIUM 150 UG/1
150 TABLET ORAL
Status: DISCONTINUED | OUTPATIENT
Start: 2019-06-11 | End: 2019-06-11 | Stop reason: HOSPADM

## 2019-06-10 RX ADMIN — Medication 10 ML: at 21:40

## 2019-06-10 RX ADMIN — IOPAMIDOL 100 ML: 755 INJECTION, SOLUTION INTRAVENOUS at 15:27

## 2019-06-10 RX ADMIN — Medication 10 ML: at 15:27

## 2019-06-10 RX ADMIN — POTASSIUM CHLORIDE 40 MEQ: 20 TABLET, EXTENDED RELEASE ORAL at 15:04

## 2019-06-10 RX ADMIN — SODIUM CHLORIDE 100 ML: 900 INJECTION, SOLUTION INTRAVENOUS at 15:27

## 2019-06-10 RX ADMIN — MONTELUKAST SODIUM 10 MG: 10 TABLET ORAL at 22:00

## 2019-06-10 NOTE — H&P
[facility] CARDIOLOGY History &Physical                 Primary Cardiologist: Dr Paige Okeefe    Primary Care Physician: Dr Dorian Webster    Admitting Physician: Dr Mcneill Patch:     Patient is a 48 y.o. female who presents with chest pain. She has a h/o hyperlipidemia and hypothyroidism. She was seen  by Dr Paige Okeefe w CP and nuke showed normal perfusion, CT calcium score 0. She presented to the ER with chest pain. This afternoon about noon she began having grabbing 10/10 pain in the center of her chest, radiating to her jaw, severe crushing pressure with nausea, no dyspnea, with diaphoresis, which improved with nitro given by EMS. Much worse than Cp she had last year. Pain lasted about 30 minutes, was not noticeably worse w exertion but she didn't do much activity once pain started. Pain associated with a light headed sensation. Since she arrived to the ER no recurrent CP. No palpitations, dizziness or syncope. Trop 0, less than .02 x 2, WBC 7.6, hgb 14.2, , K 2.8, cr .61, d-dimer . 59, CT chest no acute process, EKG NSR w rate 82 w no ST changes, /77. Pt adopted and does not know medical history, no h/o tobacco use.      Past Medical History:   Diagnosis Date    Elevated cholesterol     Hypertension       Past Surgical History:   Procedure Laterality Date    HX BREAST BIOPSY Bilateral     HX CHOLECYSTECTOMY      HX GYN       x 2    HX OTHER SURGICAL      partial hysterectomy      Allergies   Allergen Reactions    Ciprofloxacin Other (comments)     Possible leukocytoclastic vascultitis    Codeine Nausea and Vomiting    Ibuprofen Other (comments)     LEUKOCYTOCLASTIC VASULITIS    Losartan Other (comments)     Vasculitus    Naproxen Sodium Other (comments)     Possible leukocytoclastic vascultitis    Nebivolol Other (comments)     Possible leukocytoclastic vascultitis    Pravastatin Other (comments)     Possible leukocytoclastic vascultitis     Social History     Tobacco Use  Smoking status: Never Smoker    Smokeless tobacco: Never Used   Substance Use Topics    Alcohol use: No      FH:   Family History   Family history unknown: Yes        Review of Systems  General: no weight change, no weakness, fever or chills  Skin: no rashes, lumps, or other skin changes  HEENT: no headache, dizziness, lightheadedness, vision changes, hearing changes, tinnitus, vertigo, sinus pressure/pain, bleeding gums, sore throat, or hoarseness  Neck: no swollen glands, goiter, pain or stiffness  Respiratory: no cough, sputum, hemoptysis, no dyspnea, no wheezing  Cardiovascular: + as per HPI  Gastrointestinal: no reflux, constipation, diarrhea, liver problems, GI bleeding  Urinary: no frequency, urgency , hematuria, burning/pain with urination, recent flank pain, polyuria, nocturia, or difficulty urinating  Peripheral Vascular: no claudication, leg cramps, prior DVTs, swelling of calves, legs, or feet, color change, or swelling with redness or tenderness  Musculoskeletal: no muscle or joint pain/stiffness, joint swelling, erythema of joints, or back pain  Psychiatric: no depression or excessive stress  Neurological: no sensory or motor loss, seizures, syncope, tremors, numbness, tingling, no changes in mood, attention, or speech, no changes in orientation, memory, insight, or judgment. Hematologic: no anemia, easy bruising or bleeding  Endocrine: + thyroid problems on replacement, no heat or cold intolerance, excessive sweating, polyuria, polydipsia, no diabetes. Objective: There were no vitals taken for this visit. No intake/output data recorded. No intake/output data recorded.     Physical Exam:  General: Well Developed, Well Nourished, No Acute Distress  HEENT: pupils equal and round, no abnormalities noted  Neck: supple, no JVD, no carotid bruits  Heart: S1S2 with RRR without murmurs or gallops  Lungs: Clear throughout auscultation bilaterally without adventitious sounds  Abd: soft, nontender, nondistended, with good bowel sounds  Ext: warm, no edema, calves supple/nontender, pulses 2+ bilaterally  Skin: warm and dry  Psychiatric: Normal mood and affect  Neurologic: Alert and oriented X 3      ECG: NSR w rate 82 w no ST changes    Data Review:      Recent Results (from the past 24 hour(s))   EKG, 12 LEAD, INITIAL    Collection Time: 06/10/19  1:22 PM   Result Value Ref Range    Ventricular Rate 82 BPM    Atrial Rate 82 BPM    P-R Interval 128 ms    QRS Duration 82 ms    Q-T Interval 402 ms    QTC Calculation (Bezet) 469 ms    Calculated P Axis 62 degrees    Calculated R Axis 63 degrees    Calculated T Axis 80 degrees    Diagnosis       Normal sinus rhythm  Normal ECG  When compared with ECG of 14-JAN-2018 16:11,  No significant change was found  Confirmed by [facility]  MD (), MATT OLSON ([id number]) on 6/10/2019 4:11:57 PM     CBC WITH AUTOMATED DIFF    Collection Time: 06/10/19  1:30 PM   Result Value Ref Range    WBC 7.6 4.3 - 11.1 K/uL    RBC 4.75 4.05 - 5.2 M/uL    HGB 14.2 11.7 - 15.4 g/dL    HCT 41.8 35.8 - 46.3 %    MCV 88.0 79.6 - 97.8 FL    MCH 29.9 26.1 - 32.9 PG    MCHC 34.0 31.4 - 35.0 g/dL    RDW 13.2 11.9 - 14.6 %    PLATELET 777 763 - 226 K/uL    MPV 10.2 9.4 - 12.3 FL    ABSOLUTE NRBC 0.00 0.0 - 0.2 K/uL    DF AUTOMATED      NEUTROPHILS 71 43 - 78 %    LYMPHOCYTES 17 13 - 44 %    MONOCYTES 9 4.0 - 12.0 %    EOSINOPHILS 2 0.5 - 7.8 %    BASOPHILS 0 0.0 - 2.0 %    IMMATURE GRANULOCYTES 0 0.0 - 5.0 %    ABS. NEUTROPHILS 5.3 1.7 - 8.2 K/UL    ABS. LYMPHOCYTES 1.3 0.5 - 4.6 K/UL    ABS. MONOCYTES 0.7 0.1 - 1.3 K/UL    ABS. EOSINOPHILS 0.2 0.0 - 0.8 K/UL    ABS. BASOPHILS 0.0 0.0 - 0.2 K/UL    ABS. IMM.  GRANS. 0.0 0.0 - 0.5 K/UL   METABOLIC PANEL, COMPREHENSIVE    Collection Time: 06/10/19  1:30 PM   Result Value Ref Range    Sodium 132 (L) 136 - 145 mmol/L    Potassium 2.8 (LL) 3.5 - 5.1 mmol/L    Chloride 95 (L) 98 - 107 mmol/L    CO2 27 21 - 32 mmol/L    Anion gap 10 7 - 16 mmol/L Glucose 99 65 - 100 mg/dL    BUN 16 6 - 23 MG/DL    Creatinine 0.61 0.6 - 1.0 MG/DL    GFR est AA >60 >60 ml/min/1.73m2    GFR est non-AA >60 >60 ml/min/1.73m2    Calcium 8.7 8.3 - 10.4 MG/DL    Bilirubin, total 0.8 0.2 - 1.1 MG/DL    ALT (SGPT) 50 12 - 65 U/L    AST (SGOT) 29 15 - 37 U/L    Alk.  phosphatase 89 50 - 136 U/L    Protein, total 7.2 6.3 - 8.2 g/dL    Albumin 3.7 3.5 - 5.0 g/dL    Globulin 3.5 2.3 - 3.5 g/dL    A-G Ratio 1.1 (L) 1.2 - 3.5     TROPONIN I    Collection Time: 06/10/19  1:30 PM   Result Value Ref Range    Troponin-I, Qt. <0.02 (L) 0.02 - 0.05 NG/ML   D DIMER    Collection Time: 06/10/19  1:30 PM   Result Value Ref Range    D DIMER 0.59 (HH) <0.56 ug/ml(FEU)   POC TROPONIN-I    Collection Time: 06/10/19  4:30 PM   Result Value Ref Range    Troponin-I (POC) 0 (L) 0.02 - 0.05 ng/ml   TROPONIN I    Collection Time: 06/10/19  4:43 PM   Result Value Ref Range    Troponin-I, Qt. <0.02 (L) 0.02 - 0.05 NG/ML       CXR: no acute process     Assessment/Plan:   Chest pain (6/10/2019)- Pt with severe CP, negative troponin, with negative nuke last year but continued symptoms- will admit, proceed with LHC in AM, cont ASA, statin, nitro prn, check lipids in AM, monitor BP    Pure hypercholesterolemia (1/22/2018)- cont statin     Kelley Matthews PA-C  6/10/2019  5:57 PM

## 2019-06-10 NOTE — ED TRIAGE NOTES
[facility] brought pt in from [facility] after having chest pain. Pt was given 324 mg asa, 2 ntg sL and she is pain free. Pt has 18 g RAC and no cardiac history.

## 2019-06-10 NOTE — PROGRESS NOTES
Verbal bedside report given to KT, oncoming RN. Patient's situation, background, assessment and recommendations provided. Kardex, Mar, and recent results also reviewed. Opportunity for questions provided. Oncoming RN assumed care of patient.

## 2019-06-10 NOTE — PROGRESS NOTES
Teaching and instructions regarding NPO. Nothing to eat or drink after MN. Patient verbalized understanding. No further questions, request or complaints when asked.

## 2019-06-10 NOTE — PROGRESS NOTES
Patient received to room 325 as direct admit. Patient oriented to room, call light and plan of care. RENA Vizcaino made aware of patient's arrival. Admission assessment completed. Admission skin assessment completed with second RN and reveals the following: intact sacrum and heels. A few small scars on abdomen from a previous surgery.  All healed        06/10/19 5083   Skin Integumentary   Skin Integumentary (WDL) WDL    Pressure  Injury Documentation No Pressure Injury Noted-Pressure Ulcer Prevention Initiated   Skin Integrity Intact;Scars (comment)   Wound Prevention and Protection Methods   Orientation of Wound Prevention Mid;Posterior   Location of Wound Prevention Sacrum/Coccyx   Dressing Present  No   Wound Offloading (Prevention Methods) Bed, pressure reduction mattress;Pillows;Repositioning;Turning

## 2019-06-10 NOTE — ED PROVIDER NOTES
Patient is a 47 yo female who presents with chest pain. Patient states pain began about 3 hours ago, located in the center of her chest and to her right shoulder and jaw. States is was a \"pressure\" like pain and improved with nitro by EMS. States some nausea without vomiting, no fevers or chills, no cough or SOB, no further complaints and currently pain free.            Past Medical History:   Diagnosis Date    Elevated cholesterol     Hypertension        Past Surgical History:   Procedure Laterality Date    HX BREAST BIOPSY Bilateral     HX CHOLECYSTECTOMY      HX GYN       x 2    HX OTHER SURGICAL      partial hysterectomy         Family History:   Family history unknown: Yes       Social History     Socioeconomic History    Marital status:      Spouse name: Not on file    Number of children: Not on file    Years of education: Not on file    Highest education level: Not on file   Occupational History    Not on file   Social Needs    Financial resource strain: Not on file    Food insecurity:     Worry: Not on file     Inability: Not on file    Transportation needs:     Medical: Not on file     Non-medical: Not on file   Tobacco Use    Smoking status: Never Smoker    Smokeless tobacco: Never Used   Substance and Sexual Activity    Alcohol use: No    Drug use: No    Sexual activity: Never   Lifestyle    Physical activity:     Days per week: Not on file     Minutes per session: Not on file    Stress: Not on file   Relationships    Social connections:     Talks on phone: Not on file     Gets together: Not on file     Attends Zoroastrianism service: Not on file     Active member of club or organization: Not on file     Attends meetings of clubs or organizations: Not on file     Relationship status: Not on file    Intimate partner violence:     Fear of current or ex partner: Not on file     Emotionally abused: Not on file     Physically abused: Not on file     Forced sexual activity: Not on file   Other Topics Concern    Not on file   Social History Narrative    Not on file         ALLERGIES: Ciprofloxacin; Codeine; Ibuprofen; Losartan; Naproxen sodium; Nebivolol; and Pravastatin    Review of Systems   Constitutional: Negative for chills and fever. HENT: Negative for rhinorrhea and sore throat. Eyes: Negative for visual disturbance. Respiratory: Negative for cough and shortness of breath. Cardiovascular: Positive for chest pain. Negative for leg swelling. Gastrointestinal: Negative for abdominal pain, diarrhea, nausea and vomiting. Genitourinary: Negative for dysuria. Musculoskeletal: Negative for back pain and neck pain. Skin: Negative for rash. Neurological: Negative for weakness and headaches. Psychiatric/Behavioral: The patient is not nervous/anxious. Vitals:    06/10/19 1327   BP: 133/77   Pulse: 77   Resp: 16   Temp: 98.5 °F (36.9 °C)   SpO2: 98%   Weight: 94.3 kg (208 lb)   Height: 5' 5\" (1.651 m)            Physical Exam   Constitutional: She is oriented to person, place, and time. She appears well-developed and well-nourished. HENT:   Head: Normocephalic. Right Ear: External ear normal.   Left Ear: External ear normal.   Eyes: Pupils are equal, round, and reactive to light. Conjunctivae and EOM are normal.   Neck: Normal range of motion. Neck supple. No tracheal deviation present. Cardiovascular: Normal rate, regular rhythm, normal heart sounds and intact distal pulses. No murmur heard. Pulmonary/Chest: Effort normal and breath sounds normal. No respiratory distress. Abdominal: Soft. There is no tenderness. Musculoskeletal: Normal range of motion. Neurological: She is alert and oriented to person, place, and time. No cranial nerve deficit. Skin: No rash noted. Nursing note and vitals reviewed.        MDM  Number of Diagnoses or Management Options  Chest pain, unspecified type: new and requires workup     Amount and/or Complexity of Data Reviewed  Clinical lab tests: ordered and reviewed  Tests in the radiology section of CPT®: ordered and reviewed  Tests in the medicine section of CPT®: ordered and reviewed  Review and summarize past medical records: yes    Risk of Complications, Morbidity, and/or Mortality  Presenting problems: high  Diagnostic procedures: high  Management options: high    Patient Progress  Patient progress: stable         Procedures  Recent Results (from the past 12 hour(s))   EKG, 12 LEAD, INITIAL    Collection Time: 06/10/19  1:22 PM   Result Value Ref Range    Ventricular Rate 82 BPM    Atrial Rate 82 BPM    P-R Interval 128 ms    QRS Duration 82 ms    Q-T Interval 402 ms    QTC Calculation (Bezet) 469 ms    Calculated P Axis 62 degrees    Calculated R Axis 63 degrees    Calculated T Axis 80 degrees    Diagnosis       Normal sinus rhythm  Normal ECG  When compared with ECG of 14-JAN-2018 16:11,  No significant change was found  Confirmed by Mary Anne Saeed MD (), MATT OLSON ([id number]) on 6/10/2019 4:11:57 PM     CBC WITH AUTOMATED DIFF    Collection Time: 06/10/19  1:30 PM   Result Value Ref Range    WBC 7.6 4.3 - 11.1 K/uL    RBC 4.75 4.05 - 5.2 M/uL    HGB 14.2 11.7 - 15.4 g/dL    HCT 41.8 35.8 - 46.3 %    MCV 88.0 79.6 - 97.8 FL    MCH 29.9 26.1 - 32.9 PG    MCHC 34.0 31.4 - 35.0 g/dL    RDW 13.2 11.9 - 14.6 %    PLATELET 157 246 - 985 K/uL    MPV 10.2 9.4 - 12.3 FL    ABSOLUTE NRBC 0.00 0.0 - 0.2 K/uL    DF AUTOMATED      NEUTROPHILS 71 43 - 78 %    LYMPHOCYTES 17 13 - 44 %    MONOCYTES 9 4.0 - 12.0 %    EOSINOPHILS 2 0.5 - 7.8 %    BASOPHILS 0 0.0 - 2.0 %    IMMATURE GRANULOCYTES 0 0.0 - 5.0 %    ABS. NEUTROPHILS 5.3 1.7 - 8.2 K/UL    ABS. LYMPHOCYTES 1.3 0.5 - 4.6 K/UL    ABS. MONOCYTES 0.7 0.1 - 1.3 K/UL    ABS. EOSINOPHILS 0.2 0.0 - 0.8 K/UL    ABS. BASOPHILS 0.0 0.0 - 0.2 K/UL    ABS. IMM.  GRANS. 0.0 0.0 - 0.5 K/UL   METABOLIC PANEL, COMPREHENSIVE    Collection Time: 06/10/19  1:30 PM   Result Value Ref Range    Sodium 132 (L) 136 - 145 mmol/L    Potassium 2.8 (LL) 3.5 - 5.1 mmol/L    Chloride 95 (L) 98 - 107 mmol/L    CO2 27 21 - 32 mmol/L    Anion gap 10 7 - 16 mmol/L    Glucose 99 65 - 100 mg/dL    BUN 16 6 - 23 MG/DL    Creatinine 0.61 0.6 - 1.0 MG/DL    GFR est AA >60 >60 ml/min/1.73m2    GFR est non-AA >60 >60 ml/min/1.73m2    Calcium 8.7 8.3 - 10.4 MG/DL    Bilirubin, total 0.8 0.2 - 1.1 MG/DL    ALT (SGPT) 50 12 - 65 U/L    AST (SGOT) 29 15 - 37 U/L    Alk. phosphatase 89 50 - 136 U/L    Protein, total 7.2 6.3 - 8.2 g/dL    Albumin 3.7 3.5 - 5.0 g/dL    Globulin 3.5 2.3 - 3.5 g/dL    A-G Ratio 1.1 (L) 1.2 - 3.5     TROPONIN I    Collection Time: 06/10/19  1:30 PM   Result Value Ref Range    Troponin-I, Qt. <0.02 (L) 0.02 - 0.05 NG/ML   D DIMER    Collection Time: 06/10/19  1:30 PM   Result Value Ref Range    D DIMER 0.59 (HH) <0.56 ug/ml(FEU)   POC TROPONIN-I    Collection Time: 06/10/19  4:30 PM   Result Value Ref Range    Troponin-I (POC) 0 (L) 0.02 - 0.05 ng/ml     Xr Chest Pa Lat    Result Date: 6/10/2019  CHEST X-RAY, 2 views. HISTORY:  Chest pain. TECHNIQUE: PA and lateral views. COMPARISON: January 2018. FINDINGS: The lungs are clear. The heart size is normal. The costophrenic angles are sharp. The pulmonary vasculature is unremarkable. Included portion of the upper abdomen is unremarkable. IMPRESSION: Negative for acute abnormality. Ct Chest W Cont    Result Date: 6/10/2019  CT OF THE CHEST WITH INTRAVENOUS CONTRAST. INDICATION: Chest pain. COMPARISON: None. TECHNIQUE:   2.5 mm axial scans from above the aortic arch to the lung bases with 100 cc nonionic intravenous contrast without acute complication. Intravenous contrast was given to evaluate for pulmonary embolism. FINDINGS:  The degree of opacification of the pulmonary arteries is adequate. No intraluminal filling defects within the pulmonary arterial tree to suggest pulmonary embolism.   Aorta is normal caliber with a uniform lumen without evidence of dissection. Lungs are clear. No enlarged axillary, hilar or mediastinal lymph nodes. Included portion of the upper abdomen unremarkable. Adrenal glands are normal size. There are cholecystectomy clips. IMPRESSION:  Negative. 49 yo female with chest pain:    Initial troponin WNL and EKG is NSR without acute ischemic changes, however patients HEART score 5 so discussed patient with Dr. Rick Daley from cardiology and will admit for further workup and management.

## 2019-06-10 NOTE — ROUTINE PROCESS
TRANSFER - OUT REPORT: 
 
Verbal report given to Rogena Dancer RN, of STDT(name) on [facility]  being transferred to [address] [place] [address](unit) for routine progression of care Report consisted of patients Situation, Background, Assessment and  
Recommendations(SBAR). Information from the following report(s) SBAR was reviewed with the receiving nurse. Lines:  
Peripheral IV 06/10/19 Left Antecubital (Active) Opportunity for questions and clarification was provided. Patient transported with: 
 Monitor [address]

## 2019-06-11 VITALS
RESPIRATION RATE: 16 BRPM | BODY MASS INDEX: 34.37 KG/M2 | TEMPERATURE: 98.1 F | SYSTOLIC BLOOD PRESSURE: 145 MMHG | WEIGHT: 206.3 LBS | HEIGHT: 65 IN | DIASTOLIC BLOOD PRESSURE: 83 MMHG | HEART RATE: 70 BPM | OXYGEN SATURATION: 98 %

## 2019-06-11 LAB
ANION GAP SERPL CALC-SCNC: 7 MMOL/L (ref 7–16)
BUN SERPL-MCNC: 14 MG/DL (ref 6–23)
CALCIUM SERPL-MCNC: 8.6 MG/DL (ref 8.3–10.4)
CHLORIDE SERPL-SCNC: 101 MMOL/L (ref 98–107)
CHOLEST SERPL-MCNC: 206 MG/DL
CO2 SERPL-SCNC: 30 MMOL/L (ref 21–32)
CREAT SERPL-MCNC: 0.66 MG/DL (ref 0.6–1)
GLUCOSE SERPL-MCNC: 88 MG/DL (ref 65–100)
HDLC SERPL-MCNC: 74 MG/DL (ref 40–60)
HDLC SERPL: 2.8 {RATIO}
LDLC SERPL CALC-MCNC: 112 MG/DL
LIPID PROFILE,FLP: ABNORMAL
POTASSIUM SERPL-SCNC: 3 MMOL/L (ref 3.5–5.1)
SODIUM SERPL-SCNC: 138 MMOL/L (ref 136–145)
TRIGL SERPL-MCNC: 100 MG/DL (ref 35–150)
VLDLC SERPL CALC-MCNC: 20 MG/DL (ref 6–23)

## 2019-06-11 PROCEDURE — 36415 COLL VENOUS BLD VENIPUNCTURE: CPT

## 2019-06-11 PROCEDURE — 80061 LIPID PANEL: CPT

## 2019-06-11 PROCEDURE — 74011250637 HC RX REV CODE- 250/637: Performed by: NURSE PRACTITIONER

## 2019-06-11 PROCEDURE — 74011250636 HC RX REV CODE- 250/636: Performed by: PHYSICIAN ASSISTANT

## 2019-06-11 PROCEDURE — 77030020263 HC SOL INJ SOD CL0.9% LFCR 1000ML

## 2019-06-11 PROCEDURE — 99153 MOD SED SAME PHYS/QHP EA: CPT

## 2019-06-11 PROCEDURE — 74011250637 HC RX REV CODE- 250/637: Performed by: PHYSICIAN ASSISTANT

## 2019-06-11 PROCEDURE — 74011636320 HC RX REV CODE- 636/320: Performed by: INTERNAL MEDICINE

## 2019-06-11 PROCEDURE — 77030004534 HC CATH ANGI DX INFN CARD -A

## 2019-06-11 PROCEDURE — 74011250636 HC RX REV CODE- 250/636: Performed by: INTERNAL MEDICINE

## 2019-06-11 PROCEDURE — 99218 HC RM OBSERVATION: CPT

## 2019-06-11 PROCEDURE — C1894 INTRO/SHEATH, NON-LASER: HCPCS

## 2019-06-11 PROCEDURE — 77030029997 HC DEV COM RDL R BND TELE -B

## 2019-06-11 PROCEDURE — 74011000250 HC RX REV CODE- 250: Performed by: INTERNAL MEDICINE

## 2019-06-11 PROCEDURE — 77030015766

## 2019-06-11 PROCEDURE — 93458 L HRT ARTERY/VENTRICLE ANGIO: CPT

## 2019-06-11 PROCEDURE — 80048 BASIC METABOLIC PNL TOTAL CA: CPT

## 2019-06-11 PROCEDURE — 74011250637 HC RX REV CODE- 250/637: Performed by: INTERNAL MEDICINE

## 2019-06-11 PROCEDURE — 99152 MOD SED SAME PHYS/QHP 5/>YRS: CPT

## 2019-06-11 PROCEDURE — C1769 GUIDE WIRE: HCPCS

## 2019-06-11 PROCEDURE — 74011250636 HC RX REV CODE- 250/636

## 2019-06-11 RX ORDER — POTASSIUM CHLORIDE 20 MEQ/1
40 TABLET, EXTENDED RELEASE ORAL 2 TIMES DAILY
Status: DISCONTINUED | OUTPATIENT
Start: 2019-06-11 | End: 2019-06-11 | Stop reason: HOSPADM

## 2019-06-11 RX ORDER — FENTANYL CITRATE 50 UG/ML
25-100 INJECTION, SOLUTION INTRAMUSCULAR; INTRAVENOUS
Status: DISCONTINUED | OUTPATIENT
Start: 2019-06-11 | End: 2019-06-11 | Stop reason: HOSPADM

## 2019-06-11 RX ORDER — SODIUM CHLORIDE 9 MG/ML
250 INJECTION, SOLUTION INTRAVENOUS CONTINUOUS
Status: DISPENSED | OUTPATIENT
Start: 2019-06-11 | End: 2019-06-11

## 2019-06-11 RX ORDER — HEPARIN SODIUM 200 [USP'U]/100ML
3 INJECTION, SOLUTION INTRAVENOUS CONTINUOUS
Status: DISCONTINUED | OUTPATIENT
Start: 2019-06-11 | End: 2019-06-11 | Stop reason: HOSPADM

## 2019-06-11 RX ORDER — SODIUM CHLORIDE 0.9 % (FLUSH) 0.9 %
5-40 SYRINGE (ML) INJECTION AS NEEDED
Status: DISCONTINUED | OUTPATIENT
Start: 2019-06-11 | End: 2019-06-11 | Stop reason: HOSPADM

## 2019-06-11 RX ORDER — AMLODIPINE BESYLATE 5 MG/1
5 TABLET ORAL DAILY
Status: DISCONTINUED | OUTPATIENT
Start: 2019-06-11 | End: 2019-06-11 | Stop reason: HOSPADM

## 2019-06-11 RX ORDER — SODIUM CHLORIDE 0.9 % (FLUSH) 0.9 %
5-40 SYRINGE (ML) INJECTION EVERY 8 HOURS
Status: DISCONTINUED | OUTPATIENT
Start: 2019-06-11 | End: 2019-06-11 | Stop reason: HOSPADM

## 2019-06-11 RX ORDER — LIDOCAINE HYDROCHLORIDE 10 MG/ML
5-40 INJECTION INFILTRATION; PERINEURAL
Status: DISCONTINUED | OUTPATIENT
Start: 2019-06-11 | End: 2019-06-11 | Stop reason: HOSPADM

## 2019-06-11 RX ORDER — MIDAZOLAM HYDROCHLORIDE 1 MG/ML
.5-5 INJECTION, SOLUTION INTRAMUSCULAR; INTRAVENOUS
Status: DISCONTINUED | OUTPATIENT
Start: 2019-06-11 | End: 2019-06-11 | Stop reason: HOSPADM

## 2019-06-11 RX ORDER — AMLODIPINE BESYLATE 5 MG/1
5 TABLET ORAL DAILY
Qty: 30 TAB | Refills: 11 | Status: SHIPPED | OUTPATIENT
Start: 2019-06-11 | End: 2021-04-22 | Stop reason: CLARIF

## 2019-06-11 RX ADMIN — HEPARIN SODIUM 2 ML: 10000 INJECTION INTRAVENOUS; SUBCUTANEOUS at 09:45

## 2019-06-11 RX ADMIN — DULOXETIN HYDROCHLORIDE 30 MG: 30 CAPSULE, DELAYED RELEASE ORAL at 08:05

## 2019-06-11 RX ADMIN — POTASSIUM CHLORIDE 40 MEQ: 20 TABLET, EXTENDED RELEASE ORAL at 08:54

## 2019-06-11 RX ADMIN — LEVOTHYROXINE SODIUM 150 MCG: 150 TABLET ORAL at 08:05

## 2019-06-11 RX ADMIN — HEPARIN SODIUM 3 ML/HR: 5000 INJECTION, SOLUTION INTRAVENOUS; SUBCUTANEOUS at 09:38

## 2019-06-11 RX ADMIN — LIDOCAINE HYDROCHLORIDE 2 ML: 10 INJECTION, SOLUTION INFILTRATION; PERINEURAL at 09:45

## 2019-06-11 RX ADMIN — MIDAZOLAM HYDROCHLORIDE 2 MG: 1 INJECTION, SOLUTION INTRAMUSCULAR; INTRAVENOUS at 09:30

## 2019-06-11 RX ADMIN — IOPAMIDOL 70 ML: 755 INJECTION, SOLUTION INTRAVENOUS at 09:45

## 2019-06-11 RX ADMIN — SODIUM CHLORIDE 100 ML/HR: 900 INJECTION, SOLUTION INTRAVENOUS at 01:08

## 2019-06-11 RX ADMIN — FENTANYL CITRATE 50 MCG: 50 INJECTION, SOLUTION INTRAMUSCULAR; INTRAVENOUS at 09:30

## 2019-06-11 RX ADMIN — AMLODIPINE BESYLATE 5 MG: 5 TABLET ORAL at 10:48

## 2019-06-11 RX ADMIN — ASPIRIN 81 MG 81 MG: 81 TABLET ORAL at 08:06

## 2019-06-11 NOTE — PROGRESS NOTES
Reinforced instructions regarding NPO. Nothing to eat or drink after MN. Patient verbalized understanding. NPO sign posted on door frame, next to room number.

## 2019-06-11 NOTE — PROGRESS NOTES
Bedside and Verbal shift change report given to Darcy Simmons RN (oncoming nurse) by Self (offgoing nurse). Report included the following information Kardex, Intake/Output, MAR, Recent Results and Cardiac Rhythm NSR.

## 2019-06-11 NOTE — PROGRESS NOTES
TRANSFER - OUT REPORT:    Verbal report given to Freedom Shelton RN(name) on Rasheed Gibbons  being transferred to cpru(unit) for routine progression of care       Report consisted of patients Situation, Background, Assessment and   Recommendations(SBAR). Information from the following report(s) SBAR was reviewed with the receiving nurse. is allergic to ciprofloxacin; codeine; ibuprofen; losartan; naproxen sodium; nebivolol; and pravastatin. Opportunity for questions and clarification was provided. Procedure Summary:Pt had LHC via R wrist, site sealed with R band using 10 ml at 0945 hrs. Med Administration    Versed:  2 mg  Fentanyl: 50 mcg        Visit Vitals  /87 (BP 1 Location: Left arm, BP Patient Position: Supine)   Pulse 75   Temp 97.7 °F (36.5 °C)   Resp 15   Ht 5' 5\" (1.651 m)   Wt 93.6 kg (206 lb 4.8 oz)   SpO2 98%   BMI 34.33 kg/m²     Past Medical History:   Diagnosis Date    Elevated cholesterol     Hypertension            Peripheral IV 06/10/19 Left Antecubital (Active)   Site Assessment Clean, dry, & intact 6/11/2019  7:25 AM   Phlebitis Assessment 0 6/11/2019  7:25 AM   Infiltration Assessment 0 6/11/2019  7:25 AM   Dressing Status Clean, dry, & intact 6/11/2019  7:25 AM   Dressing Type Transparent;Tape 6/11/2019  7:25 AM   Hub Color/Line Status Green;Patent; Flushed 6/11/2019  7:25 AM   Alcohol Cap Used No 6/11/2019 12:00 AM       Peripheral IV 06/10/19 Right Hand (Active)   Site Assessment Clean, dry, & intact 6/11/2019  7:25 AM   Phlebitis Assessment 0 6/11/2019  7:25 AM   Infiltration Assessment 0 6/11/2019  7:25 AM   Dressing Status Clean, dry, & intact 6/11/2019  7:25 AM   Dressing Type Transparent;Tape 6/11/2019  7:25 AM   Hub Color/Line Status Patent; Infusing 6/11/2019  7:25 AM   Alcohol Cap Used No 6/10/2019 10:00 PM

## 2019-06-11 NOTE — DISCHARGE INSTRUCTIONS
DISPOSITION: The patient is being discharged home in stable condition on a low saturated fat, low cholesterol and low salt diet. The patient is instructed to advance activities as tolerated to the limit of fatigue or shortness of breath. The patient is instructed to avoid all heavy lifting, straining, stooping or squatting for 3-5 days. The patient is instructed to watch the cath site for bleeding/oozing; if seen, the patient is instructed to apply firm pressure with a clean cloth and call [address] [facility] Cardiology at [phone]. The patient is instructed to watch for signs of infection which include: increasing area of redness, fever/hot to touch or purulent drainage at the catheterization site. The patient is instructed not to soak in a bathtub for 7-10 days, but is cleared to shower. The patient is instructed to call the office or return to the ER for immediate evaluation for any shortness of breath or chest pain not relieved by NTG. Left Heart Catheterization: About This Test  What is it? Cardiac catheterization is a test to check the left side of your heart. Your doctor might look at the shape of your heart, the motion of your heart, or the blood pressure inside the chambers. Why is this test done? This test gives information about how your heart is working. It can:  · Check blood flow and blood pressure in the chambers of the heart. · Check the pumping action of the heart. · Find out if a heart defect is present and how severe it is. · Find out how well the heart valves work. What happens during the test?  · You will get medicine to help you relax. · A thin tube called a catheter is put into a blood vessel in the groin or the arm. The doctor moves the catheter through the blood vessel into your heart. · You will get a shot to numb the skin where the catheter goes in. You may feel pressure when the doctor moves the catheter through your blood vessel into your heart. · Dye may be injected into your heart. Your doctor can watch on special monitors as the dye moves in your heart. The dye helps your doctor see blood flow in your heart. · You may feel hot or flushed for several seconds when the dye is put in.  · If a heart defect is found, cardiac catheterization sometimes is used to correct it during the test.  How long does it take? · The test will take about 30 minutes. If a problem is found and the doctor treats it, it can take a few hours longer. What happens after the test?  · You will stay in a room for at least a few hours to make sure the catheter site starts to heal. You may have a bandage or a compression device on your groin or arm to prevent bleeding. · If the catheter was placed in your groin, you may lie in bed for a few hours. If the catheter was put in your arm, you will need to keep your arm still for at least 1 hour. · You may or may not need to stay in the hospital overnight. You will get more instructions for what to do at home. · Drink plenty of fluids for several hours after the test.  Follow-up care is a key part of your treatment and safety. Be sure to make and go to all appointments, and call your doctor if you are having problems. It's also a good idea to know your test results and keep a list of the medicines you take. Where can you learn more? Go to http://earnest-buzz.info/. Enter W306 in the search box to learn more about \"Left Heart Catheterization: About This Test.\"  Current as of: July 22, 2018  Content Version: 11.9  © 8868-1224 [employer]. Care instructions adapted under license by [employer] (which disclaims liability or warranty for this information). If you have questions about a medical condition or this instruction, always ask your healthcare professional. [place] [address] any warranty or liability for your use of this information.                      Cardiac Catheterization/Angiography Discharge Instructions    *Check the puncture site frequently for swelling or bleeding. If you see any bleeding, lie down and apply pressure over the area with a clean town or washcloth. Notify your doctor for any redness, swelling, drainage or oozing from the puncture site. Notify your doctor for any fever or chills. *If the leg or arm with the puncture becomes cold, numb or painful, call [facility] Cardiology at  [phone]. *Activity should be limited for the next 48 hours. Climb stairs as little as possible and avoid any stooping, bending or strenuous activity for 48 hours. No heavy lifting (anything over 10 pounds) for three days. *Do not drive for 48 hours. *You may resume your usual diet. Drink more fluids than usual.    *Have a responsible person drive you home and stay with you for at least 24 hours after your heart catheterization/angiography. *You may remove the bandage from your Right and Arm in 24 hours. You may shower in 24 hours. No tub baths, hot tubs or swimming for one week. Do not place any lotions, creams, powders, ointments over the puncture site for one week. You may place a clean band-aid over the puncture site each day for 5 days. Change this daily. DISCHARGE SUMMARY from Nurse    PATIENT INSTRUCTIONS:    After general anesthesia or intravenous sedation, for 24 hours or while taking prescription Narcotics:  · Limit your activities  · Do not drive and operate hazardous machinery  · Do not make important personal or business decisions  · Do  not drink alcoholic beverages  · If you have not urinated within 8 hours after discharge, please contact your surgeon on call.     Report the following to your surgeon:  · Excessive pain, swelling, redness or odor of or around the surgical area  · Temperature over 100.5  · Nausea and vomiting lasting longer than 4 hours or if unable to take medications  · Any signs of decreased circulation or nerve impairment to extremity: change in color, persistent  numbness, tingling, coldness or increase pain  · Any questions    What to do at Home:    *  Please give a list of your current medications to your Primary Care Provider. *  Please update this list whenever your medications are discontinued, doses are      changed, or new medications (including over-the-counter products) are added. *  Please carry medication information at all times in case of emergency situations. These are general instructions for a healthy lifestyle:    No smoking/ No tobacco products/ Avoid exposure to second hand smoke  Surgeon General's Warning:  Quitting smoking now greatly reduces serious risk to your health. Obesity, smoking, and sedentary lifestyle greatly increases your risk for illness    A healthy diet, regular physical exercise & weight monitoring are important for maintaining a healthy lifestyle    You may be retaining fluid if you have a history of heart failure or if you experience any of the following symptoms:  Weight gain of 3 pounds or more overnight or 5 pounds in a week, increased swelling in our hands or feet or shortness of breath while lying flat in bed. Please call your doctor as soon as you notice any of these symptoms; do not wait until your next office visit. Recognize signs and symptoms of STROKE:    F-face looks uneven    A-arms unable to move or move unevenly    S-speech slurred or non-existent    T-time-call 911 as soon as signs and symptoms begin-DO NOT go       Back to bed or wait to see if you get better-TIME IS BRAIN. Warning Signs of HEART ATTACK     Call 911 if you have these symptoms:   Chest discomfort. Most heart attacks involve discomfort in the center of the chest that lasts more than a few minutes, or that goes away and comes back. It can feel like uncomfortable pressure, squeezing, fullness, or pain.  Discomfort in other areas of the upper body.  Symptoms can include pain or discomfort in one or both arms, the back, neck, jaw, or stomach.  Shortness of breath with or without chest discomfort.  Other signs may include breaking out in a cold sweat, nausea, or lightheadedness. Don't wait more than five minutes to call 911 - MINUTES MATTER! Fast action can save your life. Calling 911 is almost always the fastest way to get lifesaving treatment. Emergency Medical Services staff can begin treatment when they arrive -- up to an hour sooner than if someone gets to the hospital by car. The discharge information has been reviewed with the patient. The patient verbalized understanding. Discharge medications reviewed with the patient and appropriate educational materials and side effects teaching were provided.   ___________________________________________________________________________________________________________________________________

## 2019-06-11 NOTE — PROGRESS NOTES
TRANSFER - OUT REPORT:    Verbal report given to NAIDA Walsh(name) on Francisco J Aj  being transferred to room 325(unit) for routine progression of care       Report consisted of patients Situation, Background, Assessment and   Recommendations(SBAR). Information from the following report(s) Procedure Summary was reviewed with the receiving nurse. Lines:   Peripheral IV 06/10/19 Left Antecubital (Active)   Site Assessment Clean, dry, & intact 6/11/2019  7:25 AM   Phlebitis Assessment 0 6/11/2019  7:25 AM   Infiltration Assessment 0 6/11/2019  7:25 AM   Dressing Status Clean, dry, & intact 6/11/2019  7:25 AM   Dressing Type Transparent;Tape 6/11/2019  7:25 AM   Hub Color/Line Status Green;Patent; Flushed 6/11/2019  7:25 AM   Alcohol Cap Used No 6/11/2019 12:00 AM       Peripheral IV 06/10/19 Right Hand (Active)   Site Assessment Clean, dry, & intact 6/11/2019  7:25 AM   Phlebitis Assessment 0 6/11/2019  7:25 AM   Infiltration Assessment 0 6/11/2019  7:25 AM   Dressing Status Clean, dry, & intact 6/11/2019  7:25 AM   Dressing Type Transparent;Tape 6/11/2019  7:25 AM   Hub Color/Line Status Patent; Infusing 6/11/2019  7:25 AM   Alcohol Cap Used No 6/10/2019 10:00 PM        Opportunity for questions and clarification was provided.

## 2019-06-11 NOTE — PROGRESS NOTES
Care Management Interventions  PCP Verified by CM: Yes  Last Visit to PCP: 04/04/19  Mode of Transport at Discharge: Other (see comment)(Donavon Richardson Spouse [phone] )  Transition of Care Consult (CM Consult): Discharge Planning  Discharge Durable Medical Equipment: No  Physical Therapy Consult: No  Occupational Therapy Consult: No  Speech Therapy Consult: No  Current Support Network: Lives with Spouse, Own Home  Confirm Follow Up Transport: Family  Plan discussed with Pt/Family/Caregiver: Yes  Freedom of Choice Offered: Yes  Discharge Location  Discharge Placement: Home      Pt admitted to 3rd floor [facility] for CP. CM met with pt to discuss CM needs & DCP. Pt is A&Ox4. Pt is indep at home with all ADLS. Pt lives with spouse & parents. Pt has no DME needs. Pt has no difficulty with obtaining medications or transport. DCP home with spouse. No further needs noted. CM to continue to monitor.

## 2019-06-11 NOTE — PHYSICIAN ADVISORY
Letter of Determination:  Outpatient status receiving Observation Services This patient was originally hospitalized as Inpatient Status on 6/10/2019 for chest pain. At this time this patient does not appear to meet the medical necessity requirements to support an inpatient level of care. It is our recommendation that this patient's hospitalization status should be changed from INPATIENT to [place] receiving OBSERVATION services via Condition Code 44.  
  
This may change due to the medical condition of the patient and new clinical evidence as the patients care progreses. The final decision regarding the patient's hospitalization status depends on the attending physician's judgement. Fran Fitzgerald MD, RADHA, Physician Advisor [address] [place] [address].

## 2019-06-11 NOTE — PROGRESS NOTES
Obtained Consent for Heart catheterization, Possible PCI. Patient reviewed and signed consent, form placed on front of chart.

## 2019-06-11 NOTE — PROGRESS NOTES
Verbal bedside report received from Keesha Salgado, offgoing RN. Patient's situation, background, assessment and recommendations were provided. Kardex, Mar, and recent results also reviewed. Opportunity for questions provided. Assumed care of patient.

## 2019-06-11 NOTE — PROGRESS NOTES
Patient to tele room via stretcher from Cath lab post [facility]. Patient transferred to bed via sheet slide. Telebox reapplied and [place] at bedside with BP cycling every 15 minutes. Gtts running: NS at continued at 75 ml/hr. TR band in place. R radial site without hematoma and without bleeding. Site clean, dry, and intact. Able to remove air upon arrival. Patient voiced understanding of keeping wrist immobilized. Instructed patient to not use arm for any pushing or pulling. Patient aware to use call light to communicate needs which is in reach of the patient. Patient verbalizes understanding.

## 2019-06-11 NOTE — PROGRESS NOTES
TRANSFER - IN REPORT:    Verbal report received from [facility], [address] on Carole Dash being received from [facility] for routine progression of care      Report consisted of patients Situation, Background, Assessment and Recommendations(SBAR). Information from the following report(s) SBAR, Kardex and Procedure Summary was reviewed with the receiving nurse. Opportunity for questions and clarification was provided. Assessment completed upon patients arrival to unit and care assumed.

## 2019-06-11 NOTE — PROGRESS NOTES
[facility] CARDIOLOGY PROGRESS NOTE           6/11/2019 8:28 AM    Admit Date: 6/10/2019      Subjective:   No CP overnight and troponins are (-)    ROS:  Cardiovascular:  As noted above    Objective:      Vitals:    06/10/19 2110 06/11/19 0104 06/11/19 0506 06/11/19 0802   BP: (!) 146/92 130/88 147/85 (!) 163/94   Pulse:  90 73 73   Resp:  17 16 16   Temp:  98.6 °F (37 °C) 97.6 °F (36.4 °C) 97.7 °F (36.5 °C)   SpO2:  100% 100% 99%   Weight:   93.6 kg (206 lb 4.8 oz)    Height:           Physical Exam:  General-No Acute Distress  Neck- supple, no JVD  CV- regular rate and rhythm no MRG  Lung- clear bilaterally  Abd- soft, nontender, nondistended  Ext- no edema bilaterally. Skin- warm and dry    Data Review:   Recent Labs     06/11/19  0605 06/10/19  1904 06/10/19  1643 06/10/19  1330     --   --  132*   K 3.0*  --   --  2.8*   BUN 14  --   --  16   CREA 0.66  --   --  0.61   GLU 88  --   --  99   WBC  --   --   --  7.6   HGB  --   --   --  14.2   HCT  --   --   --  41.8   PLT  --   --   --  388   TROIQ  --  <0.02* <0.02* <0.02*   CHOL 206*  --   --   --    LDLC 112*  --   --   --    HDL 74*  --   --   --        Assessment/Plan:     Principal Problem:    Chest pain (6/10/2019)    CP was worrisome but ECG (-) and troponins are (-). Plan [facility]. If (-) will DC home and follow up with GI.       Active Problems:    Pure hypercholesterolemia (1/22/2018)    On therapy          Diana Enciso MD  6/11/2019 8:28 AM

## 2019-06-11 NOTE — PROCEDURES
[facility]  CARDIAC CATH    Name:  Akin Arriola  MR#:  [id number]  :  1965  ACCOUNT #:  [de-identified]  DATE OF SERVICE:  2019    PRIMARY CARE PHYSICIAN:  Eh Pierce MD    BRIEF HISTORY:  The patient is a 61-year-old female who was admitted with symptoms worrisome for unstable angina, and was referred for cardiac catheterization. PROCEDURES PERFORMED:  Left heart catheterization with bilateral selective coronary angiography and left ventriculography. PREOPERATIVE DIAGNOSIS:  Unstable angina. POSTOPERATIVE DIAGNOSIS:  Noncardiac chest pain. SURGEON:  Ricardo Tello MD    ASSISTANT:  None. ESTIMATED BLOOD LOSS:  Less than 5 mL. SPECIMENS REMOVED:  None. COMPLICATIONS:  None. IMPLANTS:  None. ANESTHESIA:  Conscious sedation, start time 09:22, end time 09:40. MEDICATIONS:  2 mg Versed, 50 mcg fentanyl. MONITORING RN:  Codi Hinson. PROCEDURE:  After informed consent, she was prepped and draped in the usual sterile fashion. The right wrist was infiltrated with lidocaine. The right radial artery was accessed. A 6-Turkish sheath was advanced. A 6-Turkish Tiger catheter was utilized for left and right coronary injections. A 6-Turkish angled pigtail for left ventriculography. Isovue contrast utilized. FINDINGS:  1. Left ventricle:  Normal left ventricular size. Normal left ventricular systolic function. Ejection fraction is 70% 75%. There is no significant mitral regurgitation. No aortic valve gradient. Left end-diastolic pressure measured 6 mmHg. 2.  Left main:  Left main is short, trifurcates into LAD, ramus and circumflex vessels. Appears angiographically normal.  3.  Left anterior descending coronary: This is a relatively large vessel, gives rise to a large mid vessel diagonal.  The entire LAD diagonal system appears angiographically normal.  4.  Ramus intermedius:   This is another large vessel, bifurcates in its distal portion with the superior being larger than the inferior branch. The entire system appears angiographically normal.  5.  Left circumflex coronary: This is a conduit to a three posterolateral branches, the first of which is small, the second is the dominant and third is also a small branch. The entire system appears angiographically normal.  5.  Right coronary: This is a moderate-sized anatomically dominant vessel, gives rise to a small posterior descending and small posterolateral branch. The entire system appears angiographically normal.    Successful hemostasis with pneumatic radial band. CONCLUSIONS:  1. Normal left ventricular systolic function. 2.  Normal coronary arteries. RECOMMENDATIONS:  The patient will be treated with amlodipine 5 mg daily to address potential coronary vasospasm versus esophageal spasm versus microvascular dysfunction. Recommend following up in the office in approximately 3-4 weeks. Thank you for allowing us to participate in the care of this patient. Any questions or concerns, please feel free to contact me.       Claudio Gatica MD      [id number]  D:  06/11/2019 9:51  T:  06/11/2019 9:55  JOB #:  [id number]  CC:  Elsa Colindres MD

## 2019-06-11 NOTE — PROCEDURES
Brief Cardiac Procedure Note    Patient: Alexander Sands MRN: [id number]  SSN: [id number]    YOB: 1965  Age: 48 y.o. Sex: female      Date of Procedure: 6/11/2019     Pre-procedure Diagnosis: Typical Angina    Post-procedure Diagnosis: Non-cardiac Chest Pain    Procedure: Left Heart Catheterization    Brief Description of Procedure: See note    Performed By: Olga Allen MD     Assistants: None    Anesthesia: Moderate Sedation    Estimated Blood Loss: Less than 10 mL      Specimens: None    Implants: None    Findings:   LV:  EF 75%  LM:  NML  LAD:  NML  RI:  NML  LCx:  NML  RCA:  NML    Complications: None    Recommendations: Continue medical therapy.     Signed By: Olga Allen MD     June 11, 2019

## 2019-06-11 NOTE — DISCHARGE SUMMARY
[facility] Cardiology Discharge Summary     Patient ID:  Rasheed Gibbons  [id number]  60 y.o.  1965    Admit date: 6/10/2019    Discharge date:  06/11/2019    Admitting Physician: Scott Mcneal MD     Discharge Physician: Nicolas Olmedo, NP/Dr. Kaden Campbell    Admission Diagnoses: Chest pain [R07.9]    Discharge Diagnoses:    Diagnosis    Chest pain    Pure hypercholesterolemia    Precordial pain    Essential hypertension       Cardiology Procedures this admission:  Diagnostic left heart catheterization  Consults: None    Hospital Course: Patient presented to the ER with c/o chest pain, that was described as severe crushing pressure 10/10 with radiation to jaw ans associated nausea. Patient underwent cardiac catheterization by Dr. Kaden Campbell. Patient was found to have normal coronary arteries, with EF of 75%. Patient was started on amlodipine 5 mg daily to address potential coronary vasospasm vs esophageal spasm vs microvascular dysfunction. Patient tolerated the procedure well and was taken to the telemetry floor for recovery. The afternoon of discharge, patient was up feeling well without any complaints of chest pain or shortness of breath. Patient's right radial cath site was clean, dry and intact without hematoma or bruit. Patient's labs were WNL. Patient was seen and examined by Dr. Kaden Campbell and determined stable and ready for discharge. The patient will follow up with [facility] Cardiology -- Dr. Lola Urena in 3-4 weeks. Follow up with GI as OP. DISPOSITION: The patient is being discharged home in stable condition on a low saturated fat, low cholesterol and low salt diet. The patient is instructed to advance activities as tolerated to the limit of fatigue or shortness of breath. The patient is instructed to avoid all heavy lifting, straining, stooping or squatting for 3-5 days.  The patient is instructed to watch the cath site for bleeding/oozing; if seen, the patient is instructed to apply firm pressure with a clean cloth and call [facility] at [phone]. The patient is instructed to watch for signs of infection which include: increasing area of redness, fever/hot to touch or purulent drainage at the catheterization site. The patient is instructed not to soak in a bathtub for 7-10 days, but is cleared to shower. The patient is instructed to call the office or return to the ER for immediate evaluation for any shortness of breath or chest pain not relieved by NTG. Discharge Exam:   Visit Vitals  /77   Pulse 68   Temp 97.7 °F (36.5 °C)   Resp 15   Ht 5' 5\" (1.651 m)   Wt 93.6 kg (206 lb 4.8 oz)   SpO2 97%   BMI 34.33 kg/m²     Patient has been seen by Dr. Georgann Heimlich: see his progress note for exam details.     Recent Results (from the past 24 hour(s))   EKG, 12 LEAD, INITIAL    Collection Time: 06/10/19  1:22 PM   Result Value Ref Range    Ventricular Rate 82 BPM    Atrial Rate 82 BPM    P-R Interval 128 ms    QRS Duration 82 ms    Q-T Interval 402 ms    QTC Calculation (Bezet) 469 ms    Calculated P Axis 62 degrees    Calculated R Axis 63 degrees    Calculated T Axis 80 degrees    Diagnosis       Normal sinus rhythm  Normal ECG  When compared with ECG of 14-JAN-2018 16:11,  No significant change was found  Confirmed by Ryan Castillo MD (), MATT OLSON ([id number]) on 6/10/2019 4:11:57 PM     CBC WITH AUTOMATED DIFF    Collection Time: 06/10/19  1:30 PM   Result Value Ref Range    WBC 7.6 4.3 - 11.1 K/uL    RBC 4.75 4.05 - 5.2 M/uL    HGB 14.2 11.7 - 15.4 g/dL    HCT 41.8 35.8 - 46.3 %    MCV 88.0 79.6 - 97.8 FL    MCH 29.9 26.1 - 32.9 PG    MCHC 34.0 31.4 - 35.0 g/dL    RDW 13.2 11.9 - 14.6 %    PLATELET 508 153 - 928 K/uL    MPV 10.2 9.4 - 12.3 FL    ABSOLUTE NRBC 0.00 0.0 - 0.2 K/uL    DF AUTOMATED      NEUTROPHILS 71 43 - 78 %    LYMPHOCYTES 17 13 - 44 %    MONOCYTES 9 4.0 - 12.0 %    EOSINOPHILS 2 0.5 - 7.8 %    BASOPHILS 0 0.0 - 2.0 %    IMMATURE GRANULOCYTES 0 0.0 - 5.0 %    ABS. NEUTROPHILS 5.3 1.7 - 8.2 K/UL    ABS. LYMPHOCYTES 1.3 0.5 - 4.6 K/UL    ABS. MONOCYTES 0.7 0.1 - 1.3 K/UL    ABS. EOSINOPHILS 0.2 0.0 - 0.8 K/UL    ABS. BASOPHILS 0.0 0.0 - 0.2 K/UL    ABS. IMM. GRANS. 0.0 0.0 - 0.5 K/UL   METABOLIC PANEL, COMPREHENSIVE    Collection Time: 06/10/19  1:30 PM   Result Value Ref Range    Sodium 132 (L) 136 - 145 mmol/L    Potassium 2.8 (LL) 3.5 - 5.1 mmol/L    Chloride 95 (L) 98 - 107 mmol/L    CO2 27 21 - 32 mmol/L    Anion gap 10 7 - 16 mmol/L    Glucose 99 65 - 100 mg/dL    BUN 16 6 - 23 MG/DL    Creatinine 0.61 0.6 - 1.0 MG/DL    GFR est AA >60 >60 ml/min/1.73m2    GFR est non-AA >60 >60 ml/min/1.73m2    Calcium 8.7 8.3 - 10.4 MG/DL    Bilirubin, total 0.8 0.2 - 1.1 MG/DL    ALT (SGPT) 50 12 - 65 U/L    AST (SGOT) 29 15 - 37 U/L    Alk.  phosphatase 89 50 - 136 U/L    Protein, total 7.2 6.3 - 8.2 g/dL    Albumin 3.7 3.5 - 5.0 g/dL    Globulin 3.5 2.3 - 3.5 g/dL    A-G Ratio 1.1 (L) 1.2 - 3.5     TROPONIN I    Collection Time: 06/10/19  1:30 PM   Result Value Ref Range    Troponin-I, Qt. <0.02 (L) 0.02 - 0.05 NG/ML   D DIMER    Collection Time: 06/10/19  1:30 PM   Result Value Ref Range    D DIMER 0.59 (HH) <0.56 ug/ml(FEU)   POC TROPONIN-I    Collection Time: 06/10/19  4:30 PM   Result Value Ref Range    Troponin-I (POC) 0 (L) 0.02 - 0.05 ng/ml   TROPONIN I    Collection Time: 06/10/19  4:43 PM   Result Value Ref Range    Troponin-I, Qt. <0.02 (L) 0.02 - 0.05 NG/ML   TROPONIN I    Collection Time: 06/10/19  7:04 PM   Result Value Ref Range    Troponin-I, Qt. <0.02 (L) 0.02 - 0.05 NG/ML   LIPID PANEL    Collection Time: 06/11/19  6:05 AM   Result Value Ref Range    LIPID PROFILE          Cholesterol, total 206 (H) <200 MG/DL    Triglyceride 100 35 - 150 MG/DL    HDL Cholesterol 74 (H) 40 - 60 MG/DL    LDL, calculated 112 (H) <100 MG/DL    VLDL, calculated 20 6.0 - 23.0 MG/DL    CHOL/HDL Ratio 2.8     METABOLIC PANEL, BASIC    Collection Time: 06/11/19  6:05 AM   Result Value Ref Range    Sodium 138 136 - 145 mmol/L    Potassium 3.0 (L) 3.5 - 5.1 mmol/L    Chloride 101 98 - 107 mmol/L    CO2 30 21 - 32 mmol/L    Anion gap 7 7 - 16 mmol/L    Glucose 88 65 - 100 mg/dL    BUN 14 6 - 23 MG/DL    Creatinine 0.66 0.6 - 1.0 MG/DL    GFR est AA >60 >60 ml/min/1.73m2    GFR est non-AA >60 >60 ml/min/1.73m2    Calcium 8.6 8.3 - 10.4 MG/DL         Patient Instructions:     Current Discharge Medication List      START taking these medications    Details   amLODIPine (NORVASC) 5 mg tablet Take 1 Tab by mouth daily. Qty: 30 Tab, Refills: 11         CONTINUE these medications which have NOT CHANGED    Details   cholecalciferol, VITAMIN D3, (VITAMIN D3) 5,000 unit tab tablet Take  by mouth daily. DULoxetine (CYMBALTA) 30 mg capsule       montelukast (SINGULAIR) 10 mg tablet       fexofenadine-pseudoephedrine (ALLEGRA-D 12 HOUR)  mg Tb12 Take 1 Tab by mouth every twelve (12) hours. LEVOTHYROXINE SODIUM (LEVOTHYROXINE PO) take  by mouth. DIETHYLPROPION HCL (TENUATE PO) take  by mouth.              Signed:  Corey Chen NP  6/11/2019  10:23 Benjy Moore MD

## 2019-06-11 NOTE — PROGRESS NOTES
Discharge instructions were reviewed with patient. An opportunity was given for questions. All medications were reviewed, and information was given on the new medications - amlodopine. Patient verbalized understanding, and has no questions at this time. IV and telemetry monitor removed by primary RN.

## 2019-06-11 NOTE — PROGRESS NOTES
TRANSFER - IN REPORT:    Verbal report received from [place], RN(name) on Carole Dash  being received from cath lab(unit) for routine progression of care      Report consisted of patients Situation, Background, Assessment and   Recommendations(SBAR). Information from the following report(s) Procedure Summary was reviewed with the receiving nurse. Opportunity for questions and clarification was provided. Assessment completed upon patients arrival to unit and care assumed.

## 2019-08-21 ENCOUNTER — HOSPITAL ENCOUNTER (OUTPATIENT)
Dept: LAB | Age: 54
Discharge: HOME | End: 2019-08-21

## 2019-08-21 PROCEDURE — 88305 TISSUE EXAM BY PATHOLOGIST: CPT

## 2019-08-21 PROCEDURE — 88312 SPECIAL STAINS GROUP 1: CPT

## 2020-05-27 ENCOUNTER — APPOINTMENT (RX ONLY)
Dept: URBAN - METROPOLITAN AREA CLINIC 24 | Facility: CLINIC | Age: 55
Setting detail: DERMATOLOGY
End: 2020-05-27

## 2020-05-27 DIAGNOSIS — L30.9 DERMATITIS, UNSPECIFIED: ICD-10-CM

## 2020-05-27 DIAGNOSIS — Z87.2 PERSONAL HISTORY OF DISEASES OF THE SKIN AND SUBCUTANEOUS TISSUE: ICD-10-CM

## 2020-05-27 DIAGNOSIS — D22 MELANOCYTIC NEVI: ICD-10-CM

## 2020-05-27 PROBLEM — D22.39 MELANOCYTIC NEVI OF OTHER PARTS OF FACE: Status: ACTIVE | Noted: 2020-05-27

## 2020-05-27 PROBLEM — J30.1 ALLERGIC RHINITIS DUE TO POLLEN: Status: ACTIVE | Noted: 2020-05-27

## 2020-05-27 PROBLEM — I10 ESSENTIAL (PRIMARY) HYPERTENSION: Status: ACTIVE | Noted: 2020-05-27

## 2020-05-27 PROBLEM — L85.3 XEROSIS CUTIS: Status: ACTIVE | Noted: 2020-05-27

## 2020-05-27 PROCEDURE — ? BIOPSY BY SHAVE METHOD

## 2020-05-27 PROCEDURE — 11102 TANGNTL BX SKIN SINGLE LES: CPT

## 2020-05-27 PROCEDURE — ? OTHER

## 2020-05-27 PROCEDURE — ? COUNSELING

## 2020-05-27 PROCEDURE — 99212 OFFICE O/P EST SF 10 MIN: CPT | Mod: 25

## 2020-05-27 ASSESSMENT — LOCATION SIMPLE DESCRIPTION DERM
LOCATION SIMPLE: RIGHT MIDDLE FINGER
LOCATION SIMPLE: RIGHT EYEBROW

## 2020-05-27 ASSESSMENT — LOCATION ZONE DERM
LOCATION ZONE: FINGER
LOCATION ZONE: FACE

## 2020-05-27 ASSESSMENT — LOCATION DETAILED DESCRIPTION DERM
LOCATION DETAILED: RIGHT PROXIMAL DORSAL MIDDLE FINGER
LOCATION DETAILED: RIGHT CENTRAL EYEBROW

## 2020-05-27 NOTE — HPI: SKIN LESION
What Type Of Note Output Would You Prefer (Optional)?: Standard Output
How Severe Is Your Skin Lesion?: mild
treated_been_treated
Is This A New Presentation, Or A Follow-Up?: Skin Lesion
When Was It Treated?: 2000
Additional History: Patient stated that she gives verbal consent for treatment 10:36am Anastacio SamuelsMARA.

## 2020-05-27 NOTE — PROCEDURE: BIOPSY BY SHAVE METHOD
Depth Of Biopsy: dermis
Consent: Written consent was obtained and risks were reviewed including but not limited to scarring, infection, bleeding, scabbing, incomplete removal, nerve damage and allergy to anesthesia.
Hemostasis: Aluminum Chloride
Destruction After The Procedure: No
Anesthesia Volume In Cc: 0.1
Electrodesiccation And Curettage Text: The wound bed was treated with electrodesiccation and curettage after the biopsy was performed.
Notification Instructions: Patient will be notified of biopsy results. However, patient instructed to call the office if not contacted within 2 weeks.
Electrodesiccation Text: The wound bed was treated with electrodesiccation after the biopsy was performed.
Post-Care Instructions: I reviewed with the patient in detail post-care instructions. Patient is to keep the biopsy site dry overnight, and then apply vaseline twice daily until healed. Patient may apply hydrogen peroxide soaks to remove any crusting. Patient given a wound care sheet.
Type Of Destruction Used: Curettage
Accession #: [id number]
Cryotherapy Text: The wound bed was treated with cryotherapy after the biopsy was performed.
Was A Bandage Applied: Yes
Information: Selecting Yes will display possible errors in your note based on the variables you have selected. This validation is only offered as a suggestion for you. PLEASE NOTE THAT THE VALIDATION TEXT WILL BE REMOVED WHEN YOU FINALIZE YOUR NOTE. IF YOU WANT TO FAX A PRELIMINARY NOTE YOU WILL NEED TO TOGGLE THIS TO 'NO' IF YOU DO NOT WANT IT IN YOUR FAXED NOTE.
Biopsy Type: H and E
Wound Care: Vaseline
Curettage Text: The wound bed was treated with curettage after the biopsy was performed.
X Size Of Lesion In Cm: 0
Billing Type: Third-Party Bill
Detail Level: Detailed
Biopsy Method: Personna blade
Dressing: bandage
Anesthesia Type: 1% lidocaine without epinephrine and a 1:10 solution of 8.4% sodium bicarbonate
Silver Nitrate Text: The wound bed was treated with silver nitrate after the biopsy was performed.

## 2020-06-03 ENCOUNTER — RX ONLY (OUTPATIENT)
Age: 55
Setting detail: RX ONLY
End: 2020-06-03

## 2020-06-03 RX ORDER — CLOBETASOL PROPIONATE 0.5 MG/G
OINTMENT TOPICAL
Qty: 1 | Refills: 2 | Status: ERX | COMMUNITY
Start: 2020-06-03

## 2021-02-06 NOTE — PROCEDURE: OTHER
SOB
Detail Level: Zone
Other (Free Text): Discussed treatment options for GA. Pt wanted to proceed with biopsy for definitive diagnosis.
Note Text (......Xxx Chief Complaint.): This diagnosis correlates with the
Other (Free Text): This was shaved by Dr ARCE in 2000, path showed intradermal nevus. Given size and location within brow, recommended [facility] plastic sx for removal. Info given to patient.
Other (Free Text): Pt declined FBSE today. Given her hx of numerous dysplastic Nevi, recommended she schedule skin check asap.

## 2021-04-09 ENCOUNTER — HOSPITAL ENCOUNTER (OUTPATIENT)
Dept: CT IMAGING | Age: 56
Discharge: HOME | End: 2021-04-09
Attending: NURSE PRACTITIONER
Payer: COMMERCIAL

## 2021-04-09 DIAGNOSIS — R10.9 RIGHT FLANK PAIN: ICD-10-CM

## 2021-04-09 PROCEDURE — 74176 CT ABD & PELVIS W/O CONTRAST: CPT

## 2021-04-09 NOTE — PROGRESS NOTES
CT shows no kidney or bladder issues. There is some diverticulosis within the bowel. But no infection in the diverticula.

## 2021-04-23 ENCOUNTER — HOSPITAL ENCOUNTER (OUTPATIENT)
Dept: LAB | Age: 56
Discharge: HOME | End: 2021-04-23
Payer: COMMERCIAL

## 2021-04-26 ENCOUNTER — ANESTHESIA EVENT (OUTPATIENT)
Dept: SURGERY | Age: 56
End: 2021-04-26
Payer: COMMERCIAL

## 2021-04-27 ENCOUNTER — ANESTHESIA (OUTPATIENT)
Dept: SURGERY | Age: 56
End: 2021-04-27
Payer: COMMERCIAL

## 2021-04-27 ENCOUNTER — HOSPITAL ENCOUNTER (OUTPATIENT)
Age: 56
Setting detail: OUTPATIENT SURGERY
Discharge: HOME | End: 2021-04-27
Attending: UROLOGY | Admitting: UROLOGY
Payer: COMMERCIAL

## 2021-04-27 VITALS
OXYGEN SATURATION: 99 % | SYSTOLIC BLOOD PRESSURE: 136 MMHG | BODY MASS INDEX: 28.96 KG/M2 | WEIGHT: 174 LBS | DIASTOLIC BLOOD PRESSURE: 72 MMHG | RESPIRATION RATE: 16 BRPM | HEART RATE: 77 BPM | TEMPERATURE: 98 F

## 2021-04-27 DIAGNOSIS — N30.10 IC (INTERSTITIAL CYSTITIS): Primary | ICD-10-CM

## 2021-04-27 PROCEDURE — 76210000020 HC REC RM PH II FIRST 0.5 HR: Performed by: UROLOGY

## 2021-04-27 PROCEDURE — 2709999900 HC NON-CHARGEABLE SUPPLY: Performed by: UROLOGY

## 2021-04-27 PROCEDURE — 77030010509 HC AIRWY LMA MSK TELE -A: Performed by: ANESTHESIOLOGY

## 2021-04-27 PROCEDURE — 77030040922 HC BLNKT HYPOTHRM STRY -A: Performed by: ANESTHESIOLOGY

## 2021-04-27 PROCEDURE — 76060000032 HC ANESTHESIA 0.5 TO 1 HR: Performed by: UROLOGY

## 2021-04-27 PROCEDURE — 77030019908 HC STETH ESOPH SIMS -A: Performed by: ANESTHESIOLOGY

## 2021-04-27 PROCEDURE — 74011250636 HC RX REV CODE- 250/636: Performed by: NURSE PRACTITIONER

## 2021-04-27 PROCEDURE — 74011000250 HC RX REV CODE- 250: Performed by: NURSE ANESTHETIST, CERTIFIED REGISTERED

## 2021-04-27 PROCEDURE — 76010000138 HC OR TIME 0.5 TO 1 HR: Performed by: UROLOGY

## 2021-04-27 PROCEDURE — 74011250636 HC RX REV CODE- 250/636: Performed by: NURSE ANESTHETIST, CERTIFIED REGISTERED

## 2021-04-27 PROCEDURE — 74011250636 HC RX REV CODE- 250/636: Performed by: ANESTHESIOLOGY

## 2021-04-27 PROCEDURE — 74011250637 HC RX REV CODE- 250/637: Performed by: ANESTHESIOLOGY

## 2021-04-27 PROCEDURE — 74011000250 HC RX REV CODE- 250: Performed by: UROLOGY

## 2021-04-27 PROCEDURE — 52260 CYSTOSCOPY AND TREATMENT: CPT | Performed by: UROLOGY

## 2021-04-27 PROCEDURE — 77030040361 HC SLV COMPR DVT MDII -B: Performed by: UROLOGY

## 2021-04-27 PROCEDURE — 76210000063 HC OR PH I REC FIRST 0.5 HR: Performed by: UROLOGY

## 2021-04-27 PROCEDURE — 77030019927 HC TBNG IRR CYSTO BAXT -A: Performed by: UROLOGY

## 2021-04-27 PROCEDURE — 74011250637 HC RX REV CODE- 250/637: Performed by: UROLOGY

## 2021-04-27 RX ORDER — BUPIVACAINE HYDROCHLORIDE 5 MG/ML
INJECTION, SOLUTION EPIDURAL; INTRACAUDAL AS NEEDED
Status: DISCONTINUED | OUTPATIENT
Start: 2021-04-27 | End: 2021-04-27 | Stop reason: HOSPADM

## 2021-04-27 RX ORDER — SODIUM CHLORIDE, SODIUM LACTATE, POTASSIUM CHLORIDE, CALCIUM CHLORIDE 600; 310; 30; 20 MG/100ML; MG/100ML; MG/100ML; MG/100ML
100 INJECTION, SOLUTION INTRAVENOUS CONTINUOUS
Status: DISCONTINUED | OUTPATIENT
Start: 2021-04-27 | End: 2021-04-27 | Stop reason: HOSPADM

## 2021-04-27 RX ORDER — OXYCODONE HYDROCHLORIDE 5 MG/1
5 TABLET ORAL
Status: COMPLETED | OUTPATIENT
Start: 2021-04-27 | End: 2021-04-27

## 2021-04-27 RX ORDER — MIDAZOLAM HYDROCHLORIDE 1 MG/ML
2 INJECTION, SOLUTION INTRAMUSCULAR; INTRAVENOUS
Status: DISCONTINUED | OUTPATIENT
Start: 2021-04-27 | End: 2021-04-27 | Stop reason: HOSPADM

## 2021-04-27 RX ORDER — OXYCODONE HYDROCHLORIDE 5 MG/1
10 TABLET ORAL
Status: DISCONTINUED | OUTPATIENT
Start: 2021-04-27 | End: 2021-04-27 | Stop reason: HOSPADM

## 2021-04-27 RX ORDER — SODIUM CHLORIDE, SODIUM LACTATE, POTASSIUM CHLORIDE, CALCIUM CHLORIDE 600; 310; 30; 20 MG/100ML; MG/100ML; MG/100ML; MG/100ML
75 INJECTION, SOLUTION INTRAVENOUS CONTINUOUS
Status: DISCONTINUED | OUTPATIENT
Start: 2021-04-27 | End: 2021-04-27 | Stop reason: HOSPADM

## 2021-04-27 RX ORDER — FENTANYL CITRATE 50 UG/ML
INJECTION, SOLUTION INTRAMUSCULAR; INTRAVENOUS AS NEEDED
Status: DISCONTINUED | OUTPATIENT
Start: 2021-04-27 | End: 2021-04-27 | Stop reason: HOSPADM

## 2021-04-27 RX ORDER — LIDOCAINE HYDROCHLORIDE 10 MG/ML
0.1 INJECTION INFILTRATION; PERINEURAL AS NEEDED
Status: DISCONTINUED | OUTPATIENT
Start: 2021-04-27 | End: 2021-04-27 | Stop reason: HOSPADM

## 2021-04-27 RX ORDER — DEXAMETHASONE SODIUM PHOSPHATE 4 MG/ML
INJECTION, SOLUTION INTRA-ARTICULAR; INTRALESIONAL; INTRAMUSCULAR; INTRAVENOUS; SOFT TISSUE AS NEEDED
Status: DISCONTINUED | OUTPATIENT
Start: 2021-04-27 | End: 2021-04-27 | Stop reason: HOSPADM

## 2021-04-27 RX ORDER — ONDANSETRON 2 MG/ML
INJECTION INTRAMUSCULAR; INTRAVENOUS AS NEEDED
Status: DISCONTINUED | OUTPATIENT
Start: 2021-04-27 | End: 2021-04-27 | Stop reason: HOSPADM

## 2021-04-27 RX ORDER — NALOXONE HYDROCHLORIDE 0.4 MG/ML
0.1 INJECTION, SOLUTION INTRAMUSCULAR; INTRAVENOUS; SUBCUTANEOUS AS NEEDED
Status: DISCONTINUED | OUTPATIENT
Start: 2021-04-27 | End: 2021-04-27 | Stop reason: HOSPADM

## 2021-04-27 RX ORDER — HYDROMORPHONE HYDROCHLORIDE 1 MG/ML
0.5 INJECTION, SOLUTION INTRAMUSCULAR; INTRAVENOUS; SUBCUTANEOUS
Status: DISCONTINUED | OUTPATIENT
Start: 2021-04-27 | End: 2021-04-27 | Stop reason: HOSPADM

## 2021-04-27 RX ORDER — PROPOFOL 10 MG/ML
INJECTION, EMULSION INTRAVENOUS AS NEEDED
Status: DISCONTINUED | OUTPATIENT
Start: 2021-04-27 | End: 2021-04-27 | Stop reason: HOSPADM

## 2021-04-27 RX ORDER — PHENAZOPYRIDINE HYDROCHLORIDE 200 MG/1
200 TABLET, FILM COATED ORAL
Qty: 30 TAB | Refills: 3 | Status: SHIPPED | OUTPATIENT
Start: 2021-04-27 | End: 2021-04-30

## 2021-04-27 RX ORDER — CEFAZOLIN SODIUM/WATER 2 G/20 ML
2 SYRINGE (ML) INTRAVENOUS ONCE
Status: COMPLETED | OUTPATIENT
Start: 2021-04-27 | End: 2021-04-27

## 2021-04-27 RX ORDER — FENTANYL CITRATE 50 UG/ML
100 INJECTION, SOLUTION INTRAMUSCULAR; INTRAVENOUS ONCE
Status: DISCONTINUED | OUTPATIENT
Start: 2021-04-27 | End: 2021-04-27 | Stop reason: HOSPADM

## 2021-04-27 RX ORDER — ATROPA BELLADONNA AND OPIUM 16.2; 6 MG/1; MG/1
SUPPOSITORY RECTAL AS NEEDED
Status: DISCONTINUED | OUTPATIENT
Start: 2021-04-27 | End: 2021-04-27 | Stop reason: HOSPADM

## 2021-04-27 RX ORDER — DIPHENHYDRAMINE HYDROCHLORIDE 50 MG/ML
12.5 INJECTION, SOLUTION INTRAMUSCULAR; INTRAVENOUS ONCE
Status: DISCONTINUED | OUTPATIENT
Start: 2021-04-27 | End: 2021-04-27 | Stop reason: HOSPADM

## 2021-04-27 RX ORDER — MIDAZOLAM HYDROCHLORIDE 1 MG/ML
2 INJECTION, SOLUTION INTRAMUSCULAR; INTRAVENOUS ONCE
Status: DISCONTINUED | OUTPATIENT
Start: 2021-04-27 | End: 2021-04-27 | Stop reason: HOSPADM

## 2021-04-27 RX ORDER — LIDOCAINE HYDROCHLORIDE 20 MG/ML
INJECTION, SOLUTION EPIDURAL; INFILTRATION; INTRACAUDAL; PERINEURAL AS NEEDED
Status: DISCONTINUED | OUTPATIENT
Start: 2021-04-27 | End: 2021-04-27 | Stop reason: HOSPADM

## 2021-04-27 RX ORDER — ACETAMINOPHEN 500 MG
1000 TABLET ORAL ONCE
Status: COMPLETED | OUTPATIENT
Start: 2021-04-27 | End: 2021-04-27

## 2021-04-27 RX ORDER — HYDROCODONE BITARTRATE AND ACETAMINOPHEN 5; 325 MG/1; MG/1
1 TABLET ORAL
Qty: 20 TAB | Refills: 0 | Status: SHIPPED | OUTPATIENT
Start: 2021-04-27 | End: 2021-05-02

## 2021-04-27 RX ORDER — ONDANSETRON 2 MG/ML
4 INJECTION INTRAMUSCULAR; INTRAVENOUS ONCE
Status: DISCONTINUED | OUTPATIENT
Start: 2021-04-27 | End: 2021-04-27 | Stop reason: HOSPADM

## 2021-04-27 RX ADMIN — ONDANSETRON 4 MG: 2 INJECTION INTRAMUSCULAR; INTRAVENOUS at 12:26

## 2021-04-27 RX ADMIN — PROPOFOL 200 MG: 10 INJECTION, EMULSION INTRAVENOUS at 12:10

## 2021-04-27 RX ADMIN — FENTANYL CITRATE 50 MCG: 50 INJECTION INTRAMUSCULAR; INTRAVENOUS at 12:33

## 2021-04-27 RX ADMIN — ACETAMINOPHEN 1000 MG: 500 TABLET ORAL at 08:59

## 2021-04-27 RX ADMIN — DEXAMETHASONE SODIUM PHOSPHATE 4 MG: 4 INJECTION, SOLUTION INTRAMUSCULAR; INTRAVENOUS at 12:14

## 2021-04-27 RX ADMIN — FENTANYL CITRATE 50 MCG: 50 INJECTION INTRAMUSCULAR; INTRAVENOUS at 12:15

## 2021-04-27 RX ADMIN — LIDOCAINE HYDROCHLORIDE 80 MG: 20 INJECTION, SOLUTION EPIDURAL; INFILTRATION; INTRACAUDAL; PERINEURAL at 12:10

## 2021-04-27 RX ADMIN — OXYCODONE 5 MG: 5 TABLET ORAL at 13:22

## 2021-04-27 RX ADMIN — CEFAZOLIN 2 G: 1 INJECTION, POWDER, FOR SOLUTION INTRAVENOUS at 12:16

## 2021-04-27 RX ADMIN — SODIUM CHLORIDE, SODIUM LACTATE, POTASSIUM CHLORIDE, AND CALCIUM CHLORIDE 100 ML/HR: 600; 310; 30; 20 INJECTION, SOLUTION INTRAVENOUS at 08:59

## 2021-04-27 NOTE — DISCHARGE INSTRUCTIONS
CYSTOSCOPY    ACTIVITY   · As tolerated and as directed by your doctor. · Bathe or shower as directed by your doctor. DIET  · Clear liquids until no nausea or vomiting; then light diet for the first day. · Drink plenty of liquids. At least 8 glasses of water to help flush out bladder. Limit amount of caffeine. · Advance to regular diet on second day, unless your doctor orders otherwise. · If nausea and vomiting continues, call your doctor. PAIN  · Take pain medication as directed by your doctor. · Call your doctor if pain is NOT relieved by medication. · DO NOT take aspirin or blood thinners until directed by your doctor. CALL YOUR DOCTOR IF  · Expect blood-tinged urine. Call your doctor if it lasts more than 72 hours or if you cannot see through the urine. · Temperature of 101 degrees or above. · Unable to empty bladder. AFTER ANESTHESIA  · For the next 24 hours: DO NOT Drive, drink alcoholic beverages, or Make important decisions. · Be aware of dizziness following anesthesia and while taking pain medications        DISCHARGE SUMMARY from Nurse    PATIENT INSTRUCTIONS:    After general anesthesia or intravenous sedation, for 24 hours or while taking prescription Narcotics:  · Limit your activities  · Do not drive and operate hazardous machinery  · Do not make important personal or business decisions  · Do  not drink alcoholic beverages  · If you have not urinated within 8 hours after discharge, please contact your surgeon on call. *  Please give a list of your current medications to your Primary Care Provider. *  Please update this list whenever your medications are discontinued, doses are      changed, or new medications (including over-the-counter products) are added. *  Please carry medication information at all times in case of emergency situations.       These are general instructions for a healthy lifestyle:    No smoking/ No tobacco products/ Avoid exposure to second hand smoke    Surgeon General's Warning:  Quitting smoking now greatly reduces serious risk to your health. Obesity, smoking, and sedentary lifestyle greatly increases your risk for illness    A healthy diet, regular physical exercise & weight monitoring are important for maintaining a healthy lifestyle    You may be retaining fluid if you have a history of heart failure or if you experience any of the following symptoms:  Weight gain of 3 pounds or more overnight or 5 pounds in a week, increased swelling in our hands or feet or shortness of breath while lying flat in bed. Please call your doctor as soon as you notice any of these symptoms; do not wait until your next office visit. Recognize signs and symptoms of STROKE:    F-face looks uneven    A-arms unable to move or move unevenly    S-speech slurred or non-existent    T-time-call 911 as soon as signs and symptoms begin-DO NOT go       Back to bed or wait to see if you get better-TIME IS BRAIN. Learning About COVID-19 and Social Distancing  What is it? Social distancing means putting space between yourself and other people. The recommended distance is 6 feet, or about 2 meters. This also means staying away from any place where people may gather, such as torres or other public gathering places. Why is it important? Social distancing is the best way to reduce the spread of COVID-19. This virus seems to spread from person to person through droplets from coughing and sneezing. So if you keep your distance from others, you're less likely to get it or spread it. And social distancing is important for everyone, not just those who are at high risk of infection, like older people. You might have the virus but not have symptoms. You could then give the infection to someone you come into contact with. How is it done? Putting 6 feet, or about 2 meters, between you and other people is the recommended distance.  Also stay away from any place where people may gather, such as torres or other public gathering places. So if possible:  · Work from home, and keep your kids at home. · Don't travel if you don't have to. And avoid public transportation, ride-shares, and taxis unless you have no choice. · Limit shopping to essentials, like food and medicines. · Wear a cloth face cover if you have to go to a public place like the grocery store or pharmacy. · Don't eat in restaurants. (You can still get takeout or food deliveries.)  · Avoid crowds and busy places. Follow stay-at-home orders or other directions for your area. Where can you learn more? Go to http://www.gray.com/  Enter A133 in the search box to learn more about \"Learning About COVID-19 and Social Distancing. \"  Current as of: December 18, 2020               Content Version: 12.8  © 5143-8903 [employer]. Care instructions adapted under license by [employer] (which disclaims liability or warranty for this information). If you have questions about a medical condition or this instruction, always ask your healthcare professional. [place] [address] any warranty or liability for your use of this information.

## 2021-04-27 NOTE — BRIEF OP NOTE
Brief Postoperative Note    Patient: Florecita Knight  YOB: 1965  MRN: [id number]    Date of Procedure: 4/27/2021     Pre-Op Diagnosis: Dysuria [R30.0]  Urinary frequency [R35.0]    Post-Op Diagnosis: Same as preoperative diagnosis.       Procedure(s):  CYSTOSCOPY with HYDRODISTENTION OF THE BLADDER    Surgeon(s):  Anthony Kaye MD    Surgical Assistant: None    Anesthesia: General     Estimated Blood Loss (mL): Minimal    Complications: None    Specimens: * No specimens in log *     Implants: * No implants in log *    Drains: * No LDAs found *    Findings: see op note    Electronically Signed by Ted Villalobos MD on 4/27/2021 at 12:50 PM

## 2021-04-27 NOTE — H&P
Tigre Barron  : 1965         Chief Complaint   Patient presents with    New Patient    Interstitial Cystitis            HPI      Tigre Barron is a 50 y.o. female  [place] [facility] [place] to ongoing urinary symptoms. Patient reports last month she developed increased urinary frequency, occasional dysuria and feeling of incomplete emptying. Patient says the symptoms actually began the day before she was to go to surgery for gastric sleeve bypass. She reports even after the surgery the symptoms have continued.     She has had at least 3 urine checks done by her primary care physician and all cultures have been negative. Due to the ongoing symptoms she was referred here for further evaluation. She was given Detrol LA 4 mg to take for the pressure. She has seen slight improvement.     Daytime frequency she will void about once an hour with just a small amount of urine. She is up and down at night to urinate as well. She has no urinary leakage. She denies any significant dysuria she would just have a slight burning occasionally. Also has noticed some right flank pain that radiates around into the right lower quadrant of the abdomen. She has had no imaging.     She tells me as a teenager and young lady she would have issues with infections. She was on a daily antibiotic for some time but tells me that even back then the antibiotic did not make much of a difference.     Significant history of 2 pregnancies with 2 vaginal deliveries. Also underwent partial hysterectomy. There is no family history of any renal or bladder issues.   Patient has never had any stones.     Urine today is completely clear.             Past Medical History:   Diagnosis Date    Elevated cholesterol      Hypertension              Past Surgical History:   Procedure Laterality Date    HX BREAST BIOPSY Bilateral      HX CHOLECYSTECTOMY        HX GASTRIC BYPASS        HX GYN          x 2  HX OTHER SURGICAL         partial hysterectomy             Current Outpatient Medications   Medication Sig Dispense Refill    omeprazole (PRILOSEC) 10 mg capsule Take 10 mg by mouth daily.        DULoxetine (CYMBALTA) 30 mg capsule Take 60 mg by mouth.        triamcinolone (NASACORT AQ) 55 mcg nasal inhaler SPRAY 1 SPRAY INTO EACH NOSTRIL EVERY DAY   11    atorvastatin (LIPITOR) 40 mg tablet          montelukast (SINGULAIR) 10 mg tablet          fexofenadine-pseudoephedrine (ALLEGRA-D 12 HOUR)  mg Tb12 Take 1 Tab by mouth every twelve (12) hours.        LEVOTHYROXINE SODIUM (LEVOTHYROXINE PO) Take 150 mg by mouth.        amLODIPine (NORVASC) 5 mg tablet Take 1 Tab by mouth daily.  30 Tab 11    cholecalciferol, VITAMIN D3, (VITAMIN D3) 5,000 unit tab tablet Take  by mouth every seven (7) days.                Allergies   Allergen Reactions    Ciprofloxacin Other (comments)       Possible leukocytoclastic vascultitis    Codeine Nausea and Vomiting    Ibuprofen Other (comments)       LEUKOCYTOCLASTIC VASULITIS    Lisinopril Other (comments)    Losartan Other (comments)       Vasculitus    Naproxen Sodium Other (comments)       Possible leukocytoclastic vascultitis    Nebivolol Other (comments)       Possible leukocytoclastic vascultitis    Pravastatin Other (comments)       Possible leukocytoclastic vascultitis      Social History            Socioeconomic History    Marital status:        Spouse name: Not on file    Number of children: Not on file    Years of education: Not on file    Highest education level: Not on file   Occupational History    Not on file   Social Needs    Financial resource strain: Not on file    Food insecurity       Worry: Not on file       Inability: Not on file    Transportation needs       Medical: Not on file       Non-medical: Not on file   Tobacco Use    Smoking status: Never Smoker    Smokeless tobacco: Never Used   Substance and Sexual Activity  Alcohol use: No    Drug use: No    Sexual activity: Never   Lifestyle    Physical activity       Days per week: Not on file       Minutes per session: Not on file    Stress: Not on file   Relationships    Social connections       Talks on phone: Not on file       Gets together: Not on file       Attends Bahai service: Not on file       Active member of club or organization: Not on file       Attends meetings of clubs or organizations: Not on file       Relationship status: Not on file    Intimate partner violence       Fear of current or ex partner: Not on file       Emotionally abused: Not on file       Physically abused: Not on file       Forced sexual activity: Not on file   Other Topics Concern    Not on file   Social History Narrative    Not on file      Family History   Family history unknown: Yes         Review of Systems  Constitutional:   Negative for fever, chills, appetite change, malaise/fatigue, headaches and weight loss. Skin:  Negative for skin lesions, rash and itching. Eyes:  Negative for visual disturbance, eye pain and eye discharge. ENT:  Negative for difficulty articulating words, pain swallowing, high frequency hearing loss and dry mouth. Respiratory:  Negative for cough, blood in sputum, shortness of breath and wheezing. Cardiovascular:  Negative for chest pain, hypertension, irregular heartbeat, leg pain, leg swelling, regular rate and rhythm and varicose veins. GI:  Negative for nausea, vomiting, abdominal pain, blood in stool, constipation, diarrhea, indigestion and heartburn. Genitourinary: Positive for flank pain, urgency, frequent urination, menstrual problem and hysterectomy. Negative for urinary burning, hematuria, recurrent UTIs, history of urolithiasis, nocturia, slower stream, straining, leakage w/ urge, incomplete emptying, sexually transmitted disease, endometriosis and vaginal pain. Number of pregnancies: 2. Number of births: 2.   Musculoskeletal:  Negative for back pain, bone pain, arthralgias, tenderness, muscle weakness and neck pain. Neurological:  Negative for dizziness, focal weakness, numbness, seizures and tremors. Psychological:  Negative for depression and psychiatric problem. Endocrine:  Negative for cold intolerance, thirst, excessive urination, fatigue and heat intolerance. Hem/Lymphatic:  Negative for easy bleeding, easy bruising and frequent infections.        Urinalysis  UA - Dipstick         Results for orders placed or performed in visit on 04/02/21   AMB POC URINALYSIS DIP STICK AUTO W/O MICRO (PGU)     Status: None   Result Value Ref Range Status     Glucose (UA POC) Negative Negative mg/dL Final     Bilirubin (UA POC) Negative Negative Final     Ketones (UA POC) Negative Negative Final     Specific gravity (UA POC) 1.010 1.001 - 1.035 Final     Blood (UA POC) Negative Negative Final     pH (UA POC) 6.0 4.6 - 8.0 Final     Protein (UA POC) Negative Negative Final     Urobilinogen (POC) 0.2 mg/dL   Final     Nitrites (UA POC) Negative Negative Final     Leukocyte esterase (UA POC) Negative Negative Final         UA - Micro  WBC - 0  RBC - 0  Bacteria - 0  Epith - 0     PHYSICAL EXAM        Visit Vitals  /88   Pulse (!) 112   Temp 97.6 °F (36.4 °C) (Temporal)         GENERAL: No acute distress, Awake, Alert, Oriented X 3, Gait normal  CHEST AND LUNG: Easy work of breathing, clear to auscultation bilaterally, no cyanosis  CARDIAC: regular rate and rhythm  ABDOMEN: soft, non tender, non-distended, positive bowel sounds, no organomegaly, no palpable masses, no guarding, no rebound tenderness  SKIN: No rash, no erythema, no lacerations or abrasions, no ecchymosis  MUSCULOSKELETAL- normal gait and station.            Assessment and Plan      ICD-10-CM ICD-9-CM     1. Urinary frequency  R35.0 788.41 AMB POC URINALYSIS DIP STICK AUTO W/O MICRO (PGU)   2. Feeling of incomplete bladder emptying  R39.14 788.21     3. Dysuria  R30.0 788. 1     4.  Right flank pain  R10.9 789.09 CT UROGRAM WO CONT      I discussed with patient today that the presence of urinary symptoms with the absence of bacteria is classified as cystitis. She understands some cystitis will improve with antibiotic therapy, however, she has not seen any improvement with antibiotics. There are certain types of cystitis that antibiotics will not help. She would like to proceed with further testing in order to know whether or not though cystitis to present.     She has noticed that sodas make her symptoms worse and she is not had a soda in over 4 years.     Due to her right flank pain and right lower quadrant pain we will obtain a CT urogram.  I will call her with those results. If those results are normal we may proceed with cystoscopy/hydrodistention.     Dr. Jun Chow is supervising physician today and he approves plan of care. Rhea Vera NP     Follow-up and Dispositions    · Return for after testing.         Elements of this note have been dictated using speech recognition software. Although reviewed, errors of speech recognition may have occurred.      ADDENDUM  Pt called and would like to proceed with cystoscopy bladder hydrodistention.      CYSTOSCOPY with bladder hydrodistention- INFORMED CONSENT: The nature of the cystoscopy and its purpose were discussed with the patient. Alternative testing (or no testing) was reviewed. Risks include, but are not limited to: bleeding, infection, perforation or tear (urethra, bladder, or ureter), difficulty voiding and urinary retention requiring catheterization and side-effects from anesthesia. The benefits are judged to outweigh the risks and no guarantees of success or outcome were given or implied. No further questions .

## 2021-04-27 NOTE — ANESTHESIA POSTPROCEDURE EVALUATION
Procedure(s):  CYSTOSCOPY with HYDRODISTENTION OF THE BLADDER.     general    Anesthesia Post Evaluation      Multimodal analgesia: multimodal analgesia used between 6 hours prior to anesthesia start to PACU discharge  Patient location during evaluation: bedside  Patient participation: complete - patient participated  Level of consciousness: responsive to verbal stimuli  Pain management: adequate  Airway patency: patent  Anesthetic complications: no  Cardiovascular status: hemodynamically stable  Respiratory status: spontaneous ventilation  Hydration status: stable    Final Post Anesthesia Temperature Assessment:  Normothermia (36.0-37.5 degrees C)      INITIAL Post-op Vital signs:   Vitals Value Taken Time   /84 04/27/21 1322   Temp 36.7 °C (98 °F) 04/27/21 1255   Pulse 73 04/27/21 1322   Resp 12 04/27/21 1255   SpO2 99 % 04/27/21 1322

## 2021-04-27 NOTE — ANESTHESIA PREPROCEDURE EVALUATION
Relevant Problems   CARDIOVASCULAR   (+) Essential hypertension       Anesthetic History     Increased risk of difficult airway (Glidescope with Grade 3 view, 6.5 ett in recent past)          Review of Systems / Medical History  Patient summary reviewed and pertinent labs reviewed    Pulmonary        Sleep apnea (Improved since gastric bypass)           Neuro/Psych   Within defined limits           Cardiovascular    Hypertension              Exercise tolerance: >4 METS     GI/Hepatic/Renal     GERD (Improved since Gastric Bypass revision)           Endo/Other      Hypothyroidism       Other Findings              Physical Exam    Airway  Mallampati: III  TM Distance: < 4 cm  Neck ROM: normal range of motion   Mouth opening: Normal     Cardiovascular    Rhythm: regular  Rate: normal         Dental  No notable dental hx       Pulmonary  Breath sounds clear to auscultation               Abdominal  GI exam deferred       Other Findings            Anesthetic Plan    ASA: 2  Anesthesia type: general          Induction: Intravenous      Will plan LMA with glidescope backup

## 2021-04-27 NOTE — PERIOP NOTES
[id number]- pt to restroom to void, voided a small amount and had BM, back to stretcher and on monitor

## 2021-04-28 NOTE — OP NOTES
[facility]  OPERATIVE REPORT    Name:  Tari Officer  MR#:  [id number]  :  1965  ACCOUNT #:  [de-identified]  DATE OF SERVICE:  2021    PREOPERATIVE DIAGNOSIS:  Dysuria. POSTOPERATIVE DIAGNOSIS:  Interstitial cystitis. PROCEDURE PERFORMED:  Cystoscopy with hydrodistention of the bladder. SURGEON:  Silvano Parker. Chen Casper MD    ASSISTANT:  None. ANESTHESIA:  General.    COMPLICATIONS:  None. SPECIMENS REMOVED:  None. IMPLANTS:  None. ESTIMATED BLOOD LOSS:  None. FINDINGS:  Bladder capacity of 1050 mL, moderate glomerulations noted after hydrodistention consistent with interstitial cystitis. DESCRIPTION OF PROCEDURE:  The patient was given general anesthetic and placed in the dorsal lithotomy position. A B and O suppository was placed in the rectum. She was prepped and draped in sterile fashion for cystoscopy. A 25-Cambodian cystoscope was advanced into the urethra using the video camera and 30-degree lens. The external genitalia was normal.  Vaginal exam did not show any abnormal masses. The bladder was inspected. The mucosa was normal.  Both ureteral orifices appeared normal.  I then distended the bladder with the irrigant bag held 80 cm anterior to the level of the bladder. It was held at full distention for about 8 minutes and then allowed to drain. The capacity was 1050 mL under anesthesia. Bladder was inspected. There was no trauma. There are glomerulations noted on the trigone, posterior wall and the dome. This was consistent with interstitial cystitis. At this point, we instilled 30 mL of Marcaine in the bladder and removed the scope. PLAN:  She will be discharged home. We will have her return to the office in about 1 month.         MD LICHA [email or web address]  D:  2021 13:00  T:  2021 22:17  JOB #:  [id number]

## 2021-10-18 ENCOUNTER — HOSPITAL ENCOUNTER (OUTPATIENT)
Dept: MAMMOGRAPHY | Age: 56
Discharge: HOME | End: 2021-10-18
Attending: INTERNAL MEDICINE

## 2021-10-18 DIAGNOSIS — Z12.39 BREAST SCREENING: ICD-10-CM

## 2021-10-18 PROCEDURE — 77063 BREAST TOMOSYNTHESIS BI: CPT

## 2021-12-22 ENCOUNTER — HOSPITAL ENCOUNTER (EMERGENCY)
Age: 56
Discharge: HOME | End: 2021-12-22
Attending: EMERGENCY MEDICINE
Payer: COMMERCIAL

## 2021-12-22 VITALS
BODY MASS INDEX: 27.49 KG/M2 | OXYGEN SATURATION: 100 % | HEART RATE: 92 BPM | TEMPERATURE: 98.7 F | DIASTOLIC BLOOD PRESSURE: 86 MMHG | WEIGHT: 165 LBS | RESPIRATION RATE: 16 BRPM | HEIGHT: 65 IN | SYSTOLIC BLOOD PRESSURE: 162 MMHG

## 2021-12-22 DIAGNOSIS — U07.1 COVID-19 VIRUS INFECTION: Primary | ICD-10-CM

## 2021-12-22 PROCEDURE — 74011000636 HC RX REV CODE- 636: Performed by: NURSE PRACTITIONER

## 2021-12-22 PROCEDURE — 74011250637 HC RX REV CODE- 250/637: Performed by: NURSE PRACTITIONER

## 2021-12-22 PROCEDURE — 74011000258 HC RX REV CODE- 258: Performed by: NURSE PRACTITIONER

## 2021-12-22 PROCEDURE — 99283 EMERGENCY DEPT VISIT LOW MDM: CPT

## 2021-12-22 PROCEDURE — M0243 CASIRIVI AND IMDEVI INFUSION: HCPCS

## 2021-12-22 RX ORDER — OXYMETAZOLINE HCL 0.05 %
2 SPRAY, NON-AEROSOL (ML) NASAL
Status: COMPLETED | OUTPATIENT
Start: 2021-12-22 | End: 2021-12-22

## 2021-12-22 RX ADMIN — OXYMETAZOLINE HCL 2 SPRAY: 0.05 SPRAY NASAL at 18:31

## 2021-12-22 RX ADMIN — CASIRIVIMAB AND IMDEVIMAB: 600; 600 INJECTION, SOLUTION, CONCENTRATE INTRAVENOUS at 18:28

## 2021-12-22 NOTE — ED TRIAGE NOTES
Pt ambulatory to triage. Pt states she tested positive for Covid Dec 20th, symptoms started that day as well in the morning. Pt's symptoms include fever/chills, cough, headache, body aches, sore throat, fatigue. Pt sent here for the antibody infusion. Pt's medical history includes gastric bypass surgery this year and hypercholesteremia, and is being worked-up for possible auto-immune connective disorder.

## 2021-12-22 NOTE — ED PROVIDER NOTES
74-year-old white female who is currently being worked up for possible autoimmune disorder presents with chills aching cough and fever onset 2 to 3 days ago. She did a home Covid test which was positive and also had a PCR test done 2 days ago. She got results last night as positive. Was sent by primary care for monoclonal antibodies. No vomiting or diarrhea. Patient has been vaccinated. The history is provided by the patient.    Positive For Covid-19  Associated symptoms: chills, cough and headaches    Associated symptoms: no chest pain, no rash and no vomiting         Past Medical History:   Diagnosis Date    Difficult intubation     2021 [place], CRNA had trouble intubating and anesthesiologist had to place- however has never been a problem in previous proceduress    Elevated cholesterol     GERD (gastroesophageal reflux disease)     conversion surgery 2021 due to severe GERD with g-sleeve- omeprazole daily- well controlled - 1 pillow now    Hypertension     Menopause     Mood disorder [facility])     Surgeon started pt on prior to G-sleeve surgery,     PONV (postoperative nausea and vomiting)     severe with Gastric sleeve conversion surgery 2021- none prior    Sleep apnea     off CPAP since gastric bypass/ has not been re-evaluated    Thyroid disease     hyperthyroidism-   thyroid removed, levothyroxine daily       Past Surgical History:   Procedure Laterality Date    HX BREAST BIOPSY Bilateral     benign    HX  SECTION       x 2    HX CHOLECYSTECTOMY      HX GASTRECTOMY  2016    Sleeve    HX GASTRIC BYPASS  2021    HX HYSTERECTOMY      HX OOPHORECTOMY  2004    unilateral    HX THYROIDECTOMY  2008    HX TONSILLECTOMY      IN SINUS SURGERY PROC UNLISTED      IN SINUS SURGERY PROC UNLISTED  early 5         Family History:   Family history unknown: Yes       Social History     Socioeconomic History    Marital status:      Spouse name: Not on file    Number of children: Not on file    Years of education: Not on file    Highest education level: Not on file   Occupational History    Not on file   Tobacco Use    Smoking status: Never Smoker    Smokeless tobacco: Never Used   Vaping Use    Vaping Use: Never used   Substance and Sexual Activity    Alcohol use: No    Drug use: No    Sexual activity: Never   Other Topics Concern    Not on file   Social History Narrative    Not on file     Social Determinants of Health     Financial Resource Strain:     Difficulty of Paying Living Expenses: Not on file   Food Insecurity:     Worried About Running Out of Food in the Last Year: Not on file    Adan of Food in the Last Year: Not on file   Transportation Needs:     Lack of Transportation (Medical): Not on file    Lack of Transportation (Non-Medical): Not on file   Physical Activity:     Days of Exercise per Week: Not on file    Minutes of Exercise per Session: Not on file   Stress:     Feeling of Stress : Not on file   Social Connections:     Frequency of Communication with Friends and Family: Not on file    Frequency of Social Gatherings with Friends and Family: Not on file    Attends Baptism Services: Not on file    Active Member of [address] [place] [address] [employer] or Organizations: Not on file    Attends Club or Organization Meetings: Not on file    Marital Status: Not on file   Intimate Partner Violence:     Fear of Current or Ex-Partner: Not on file    Emotionally Abused: Not on file    Physically Abused: Not on file    Sexually Abused: Not on file   Housing Stability:     Unable to Pay for Housing in the Last Year: Not on file    Number of [place] in the Last Year: Not on file    Unstable Housing in the Last Year: Not on file         ALLERGIES: Ciprofloxacin, Codeine, Ibuprofen, Lisinopril, Losartan, Naproxen sodium, Nebivolol, and Pravastatin    Review of Systems   Constitutional: Positive for chills and fever.    Respiratory: Positive for cough. Negative for shortness of breath. Cardiovascular: Negative for chest pain. Gastrointestinal: Negative for abdominal pain and vomiting. Musculoskeletal: Negative for back pain and neck pain. Skin: Negative for rash. Neurological: Positive for headaches. All other systems reviewed and are negative. Vitals:    12/22/21 1546 12/22/21 1725   BP: (!) 143/92 (!) 146/98   Pulse: 75 81   Resp: 16    Temp: 98.6 °F (37 °C) 98.7 °F (37.1 °C)   SpO2: 98% 98%   Weight: 74.8 kg (165 lb)    Height: 5' 5\" (1.651 m)             Physical Exam  Vitals and nursing note reviewed. Constitutional:       General: She is not in acute distress. Appearance: Normal appearance. She is not toxic-appearing. HENT:      Head: Normocephalic and atraumatic. Nose: Nose normal.      Mouth/Throat:      Mouth: Mucous membranes are moist.      Pharynx: Oropharynx is clear. Eyes:      Conjunctiva/sclera: Conjunctivae normal.      Pupils: Pupils are equal, round, and reactive to light. Cardiovascular:      Rate and Rhythm: Normal rate and regular rhythm. Heart sounds: No murmur heard. Pulmonary:      Effort: Pulmonary effort is normal.      Breath sounds: Normal breath sounds. No wheezing or rales. Abdominal:      General: There is no distension. Palpations: Abdomen is soft. Tenderness: There is no abdominal tenderness. There is no guarding. Musculoskeletal:         General: No swelling. Normal range of motion. Cervical back: Normal range of motion and neck supple. Skin:     General: Skin is warm and dry. Neurological:      Mental Status: She is alert and oriented to person, place, and time. Psychiatric:         Mood and Affect: Mood normal.         Behavior: Behavior normal.          MDM  Number of Diagnoses or Management Options  COVID-19 virus infection  Diagnosis management comments: Patient is not hypoxic and does not require further emergent work-up.   Will treat with monoclonal antibody.   She is safe for outpatient follow-up       Amount and/or Complexity of Data Reviewed  Tests in the medicine section of CPT®: ordered and reviewed    Risk of Complications, Morbidity, and/or Mortality  Presenting problems: low  Diagnostic procedures: low  Management options: low           Procedures

## 2021-12-23 ENCOUNTER — PATIENT OUTREACH (OUTPATIENT)
Dept: CASE MANAGEMENT | Age: 56
End: 2021-12-23

## 2021-12-23 NOTE — ED NOTES
I have reviewed discharge instructions with the patient. The patient verbalized understanding. Patient left ED via Discharge Method: ambulatory to Home with self  Opportunity for questions and clarification provided. Patient given 0 scripts. To continue your aftercare when you leave the hospital, you may receive an automated call from our care team to check in on how you are doing. This is a free service and part of our promise to provide the best care and service to meet your aftercare needs.  If you have questions, or wish to unsubscribe from this service please call [phone]. Thank you for Choosing our [facility].

## 2021-12-23 NOTE — PROGRESS NOTES
Patient contacted regarding COVID-19 diagnosis, monoclonal antibody infusion follow up. Discussed COVID-19 related testing which was available at this time. Test results were positive. Patient informed of results, if available? yes. LPN Care Coordinator contacted the patient by telephone to perform post discharge assessment. Call within 2 business days of discharge: Yes Verified name and  with patient as identifiers. Provided introduction to self, and explanation of the CTN/ACM role, and reason for call due to risk factors for infection and/or exposure to COVID-19. Symptoms reviewed with patient who verbalized the following symptoms: cough and headache. Patient reports feeling cough is not as bad this morning and reports oxygen saturation 96%. Due to no new or worsening symptoms encounter was not routed to provider for escalation. Discussed follow-up appointments. If no appointment was previously scheduled, appointment scheduling offered:  Patient states she will schedule follow up with her PCP after her quarantine period. [facility] follow up appointment(s):   Future Appointments   Date Time Provider Kusum Colon   2022  9:15 AM Ronald Singh NP [facility]         Interventions to address risk factors: Obtained and reviewed discharge summary and/or continuity of care documents     Advance Care Planning:   Does patient have an Advance Directive: decision makers updated. Educated patient about risk for severe COVID-19 due to risk factors according to CDC guidelines. LPN CC reviewed discharge instructions, medical action plan and red flag symptoms with the patient who verbalized understanding. Discussed COVID vaccination status: yes. Education provided on COVID-19 vaccination as appropriate. Discussed exposure protocols and quarantine with CDC Guidelines.  Patient was given an opportunity to verbalize any questions and concerns and agrees to contact LPN CC or health care provider for questions related to their healthcare. Reviewed and educated patient on any new and changed medications related to discharge diagnosis     Was patient discharged with a pulse oximeter? Patient has pulse oximeter at home. Discussed and confirmed pulse oximeter discharge instructions and when to notify provider or seek emergency care. LPN CC provided contact information. Plan for follow-up call in 5-7 days based on severity of symptoms and risk factors.

## 2021-12-30 ENCOUNTER — PATIENT OUTREACH (OUTPATIENT)
Dept: CASE MANAGEMENT | Age: 56
End: 2021-12-30

## 2022-01-26 ENCOUNTER — APPOINTMENT (RX ONLY)
Dept: URBAN - METROPOLITAN AREA CLINIC 24 | Facility: CLINIC | Age: 57
Setting detail: DERMATOLOGY
End: 2022-01-26

## 2022-01-26 DIAGNOSIS — L82.1 OTHER SEBORRHEIC KERATOSIS: ICD-10-CM

## 2022-01-26 DIAGNOSIS — Z71.89 OTHER SPECIFIED COUNSELING: ICD-10-CM

## 2022-01-26 PROBLEM — L70.0 ACNE VULGARIS: Status: ACTIVE | Noted: 2022-01-26

## 2022-01-26 PROCEDURE — ? OTHER

## 2022-01-26 PROCEDURE — ? COUNSELING

## 2022-01-26 PROCEDURE — 99212 OFFICE O/P EST SF 10 MIN: CPT

## 2022-01-26 ASSESSMENT — LOCATION SIMPLE DESCRIPTION DERM
LOCATION SIMPLE: LEFT PRETIBIAL REGION
LOCATION SIMPLE: NECK

## 2022-01-26 ASSESSMENT — LOCATION DETAILED DESCRIPTION DERM
LOCATION DETAILED: LEFT SUPERIOR LATERAL NECK
LOCATION DETAILED: LEFT LATERAL DISTAL PRETIBIAL REGION

## 2022-01-26 ASSESSMENT — LOCATION ZONE DERM
LOCATION ZONE: NECK
LOCATION ZONE: LEG

## 2022-01-26 NOTE — HPI: SKIN LESION
What Type Of Note Output Would You Prefer (Optional)?: Standard Output
How Severe Is Your Skin Lesion?: mild
Has Your Skin Lesion Been Treated?: not been treated
Is This A New Presentation, Or A Follow-Up?: Skin Lesion
Additional History: Pt gives verbal consent to biopsy. CHERELLE Collins

## 2022-01-26 NOTE — PROCEDURE: OTHER
Recommended artificial tears to use: 1 drop 4x a day in both eyes.
Detail Level: Zone
Render Risk Assessment In Note?: no
Other (Free Text): Lesion on neck was discolored due to sunless miller. This was removed with alcohol. Pt reassured.
Note Text (......Xxx Chief Complaint.): This diagnosis correlates with the

## 2022-03-18 PROBLEM — I10 ESSENTIAL HYPERTENSION: Status: ACTIVE | Noted: 2018-01-22

## 2022-03-19 PROBLEM — R07.9 CHEST PAIN: Status: ACTIVE | Noted: 2019-06-10

## 2022-03-19 PROBLEM — R07.2 PRECORDIAL PAIN: Status: ACTIVE | Noted: 2018-01-22

## 2022-03-19 PROBLEM — E78.00 PURE HYPERCHOLESTEROLEMIA: Status: ACTIVE | Noted: 2018-01-22

## 2022-05-02 ENCOUNTER — APPOINTMENT (RX ONLY)
Dept: URBAN - METROPOLITAN AREA CLINIC 23 | Facility: CLINIC | Age: 57
Setting detail: DERMATOLOGY
End: 2022-05-02

## 2022-05-02 DIAGNOSIS — Z41.9 ENCOUNTER FOR PROCEDURE FOR PURPOSES OTHER THAN REMEDYING HEALTH STATE, UNSPECIFIED: ICD-10-CM

## 2022-05-02 PROCEDURE — ? COSMETIC CONSULTATION: BROWN SPOTS

## 2022-05-02 ASSESSMENT — LOCATION SIMPLE DESCRIPTION DERM: LOCATION SIMPLE: RIGHT CHEEK

## 2022-05-02 ASSESSMENT — LOCATION DETAILED DESCRIPTION DERM: LOCATION DETAILED: RIGHT INFERIOR CENTRAL MALAR CHEEK

## 2022-05-02 ASSESSMENT — LOCATION ZONE DERM: LOCATION ZONE: FACE

## 2022-05-02 NOTE — HPI: COSMETIC CONSULTATION
Additional History: has gotten dysport but interested in Filler\\ngave her Shaw[facility] and [facility] number and told bout dr that do botox here\\ndid talk bot IPL\\nwill need 2-4 x \\n350 or 1120 series\\ndo in fall because she likes self tanners\\nhas mod browns and mild to mod reds and veins\\nmay want hr on underarms eventually\\ndid nto scheudled but paid 30$ consult fee

## 2022-05-11 ENCOUNTER — APPOINTMENT (RX ONLY)
Dept: URBAN - METROPOLITAN AREA CLINIC 24 | Facility: CLINIC | Age: 57
Setting detail: DERMATOLOGY
End: 2022-05-11

## 2022-05-11 DIAGNOSIS — Z87.2 PERSONAL HISTORY OF DISEASES OF THE SKIN AND SUBCUTANEOUS TISSUE: ICD-10-CM

## 2022-05-11 ASSESSMENT — LOCATION SIMPLE DESCRIPTION DERM
LOCATION SIMPLE: LEFT UPPER BACK
LOCATION SIMPLE: RIGHT BREAST
LOCATION SIMPLE: LEFT SHOULDER
LOCATION SIMPLE: CHEST

## 2022-05-11 ASSESSMENT — LOCATION DETAILED DESCRIPTION DERM
LOCATION DETAILED: LEFT MEDIAL SUPERIOR CHEST
LOCATION DETAILED: RIGHT MEDIAL BREAST 2-3:00 REGION
LOCATION DETAILED: LEFT MID-UPPER BACK
LOCATION DETAILED: LEFT MEDIAL UPPER BACK
LOCATION DETAILED: LEFT POSTERIOR SHOULDER

## 2022-05-11 ASSESSMENT — LOCATION ZONE DERM
LOCATION ZONE: ARM
LOCATION ZONE: TRUNK

## 2022-05-18 ENCOUNTER — APPOINTMENT (RX ONLY)
Dept: URBAN - METROPOLITAN AREA CLINIC 24 | Facility: CLINIC | Age: 57
Setting detail: DERMATOLOGY
End: 2022-05-18

## 2022-05-18 DIAGNOSIS — L81.4 OTHER MELANIN HYPERPIGMENTATION: ICD-10-CM

## 2022-05-18 DIAGNOSIS — L57.8 OTHER SKIN CHANGES DUE TO CHRONIC EXPOSURE TO NONIONIZING RADIATION: ICD-10-CM

## 2022-05-18 DIAGNOSIS — Z87.2 PERSONAL HISTORY OF DISEASES OF THE SKIN AND SUBCUTANEOUS TISSUE: ICD-10-CM

## 2022-05-18 DIAGNOSIS — D22 MELANOCYTIC NEVI: ICD-10-CM

## 2022-05-18 DIAGNOSIS — L82.0 INFLAMED SEBORRHEIC KERATOSIS: ICD-10-CM

## 2022-05-18 DIAGNOSIS — L92.0 GRANULOMA ANNULARE: ICD-10-CM

## 2022-05-18 PROBLEM — D22.5 MELANOCYTIC NEVI OF TRUNK: Status: ACTIVE | Noted: 2022-05-18

## 2022-05-18 PROBLEM — L70.0 ACNE VULGARIS: Status: ACTIVE | Noted: 2022-05-18

## 2022-05-18 PROBLEM — I10 ESSENTIAL (PRIMARY) HYPERTENSION: Status: ACTIVE | Noted: 2022-05-18

## 2022-05-18 PROBLEM — L85.3 XEROSIS CUTIS: Status: ACTIVE | Noted: 2022-05-18

## 2022-05-18 PROBLEM — L55.1 SUNBURN OF SECOND DEGREE: Status: ACTIVE | Noted: 2022-05-18

## 2022-05-18 PROBLEM — E78.5 HYPERLIPIDEMIA, UNSPECIFIED: Status: ACTIVE | Noted: 2022-05-18

## 2022-05-18 PROBLEM — D22.61 MELANOCYTIC NEVI OF RIGHT UPPER LIMB, INCLUDING SHOULDER: Status: ACTIVE | Noted: 2022-05-18

## 2022-05-18 PROBLEM — J30.1 ALLERGIC RHINITIS DUE TO POLLEN: Status: ACTIVE | Noted: 2022-05-18

## 2022-05-18 PROBLEM — D22.4 MELANOCYTIC NEVI OF SCALP AND NECK: Status: ACTIVE | Noted: 2022-05-18

## 2022-05-18 PROBLEM — D22.71 MELANOCYTIC NEVI OF RIGHT LOWER LIMB, INCLUDING HIP: Status: ACTIVE | Noted: 2022-05-18

## 2022-05-18 PROBLEM — D22.39 MELANOCYTIC NEVI OF OTHER PARTS OF FACE: Status: ACTIVE | Noted: 2022-05-18

## 2022-05-18 PROBLEM — D22.72 MELANOCYTIC NEVI OF LEFT LOWER LIMB, INCLUDING HIP: Status: ACTIVE | Noted: 2022-05-18

## 2022-05-18 PROBLEM — D22.62 MELANOCYTIC NEVI OF LEFT UPPER LIMB, INCLUDING SHOULDER: Status: ACTIVE | Noted: 2022-05-18

## 2022-05-18 PROCEDURE — ? PRESCRIPTION MEDICATION MANAGEMENT

## 2022-05-18 PROCEDURE — 17110 DESTRUCTION B9 LES UP TO 14: CPT

## 2022-05-18 PROCEDURE — ? COUNSELING

## 2022-05-18 PROCEDURE — 99214 OFFICE O/P EST MOD 30 MIN: CPT | Mod: 25

## 2022-05-18 PROCEDURE — ? PRESCRIPTION

## 2022-05-18 PROCEDURE — ? OTHER

## 2022-05-18 PROCEDURE — ? LIQUID NITROGEN

## 2022-05-18 RX ORDER — CLOBETASOL PROPIONATE 0.5 MG/G
CREAM TOPICAL BID
Qty: 60 | Refills: 4 | Status: ERX | COMMUNITY
Start: 2022-05-18

## 2022-05-18 RX ADMIN — CLOBETASOL PROPIONATE: 0.5 CREAM TOPICAL at 00:00

## 2022-05-18 ASSESSMENT — LOCATION SIMPLE DESCRIPTION DERM
LOCATION SIMPLE: RIGHT THIGH
LOCATION SIMPLE: RIGHT BREAST
LOCATION SIMPLE: RIGHT ANTERIOR NECK
LOCATION SIMPLE: RIGHT EYEBROW
LOCATION SIMPLE: RIGHT HAND
LOCATION SIMPLE: LEFT UPPER BACK
LOCATION SIMPLE: RIGHT UPPER ARM
LOCATION SIMPLE: RIGHT SHOULDER
LOCATION SIMPLE: RIGHT FOREHEAD
LOCATION SIMPLE: LEFT UPPER ARM
LOCATION SIMPLE: LEFT SHOULDER
LOCATION SIMPLE: CHEST
LOCATION SIMPLE: POSTERIOR NECK
LOCATION SIMPLE: LEFT POSTERIOR THIGH

## 2022-05-18 ASSESSMENT — LOCATION DETAILED DESCRIPTION DERM
LOCATION DETAILED: LEFT SUPERIOR UPPER BACK
LOCATION DETAILED: RIGHT MEDIAL FOREHEAD
LOCATION DETAILED: RIGHT ANTERIOR PROXIMAL UPPER ARM
LOCATION DETAILED: RIGHT MEDIAL SUPERIOR CHEST
LOCATION DETAILED: LEFT DISTAL LATERAL POSTERIOR THIGH
LOCATION DETAILED: RIGHT ANTERIOR DISTAL THIGH
LOCATION DETAILED: LEFT MID-UPPER BACK
LOCATION DETAILED: RIGHT ANTERIOR PROXIMAL THIGH
LOCATION DETAILED: LEFT MEDIAL UPPER BACK
LOCATION DETAILED: RIGHT MEDIAL BREAST 2-3:00 REGION
LOCATION DETAILED: LEFT LATERAL TRAPEZIAL NECK
LOCATION DETAILED: RIGHT POSTERIOR SHOULDER
LOCATION DETAILED: RIGHT RADIAL DORSAL HAND
LOCATION DETAILED: RIGHT INFERIOR LATERAL NECK
LOCATION DETAILED: LEFT ANTERIOR PROXIMAL UPPER ARM
LOCATION DETAILED: LEFT MEDIAL SUPERIOR CHEST
LOCATION DETAILED: RIGHT CENTRAL EYEBROW
LOCATION DETAILED: LEFT POSTERIOR SHOULDER

## 2022-05-18 ASSESSMENT — LOCATION ZONE DERM
LOCATION ZONE: ARM
LOCATION ZONE: HAND
LOCATION ZONE: FACE
LOCATION ZONE: NECK
LOCATION ZONE: TRUNK
LOCATION ZONE: LEG

## 2022-05-18 NOTE — PROCEDURE: LIQUID NITROGEN
Spray Paint Text: The liquid nitrogen was applied to the skin utilizing a spray paint frosting technique.
Render Post-Care Instructions In Note?: yes
Post-Care Instructions: I reviewed with the patient in detail post-care instructions. Patient is to wear sunprotection, and avoid picking at any of the treated lesions. Pt may apply Vaseline to crusted or scabbing areas.
Medical Necessity Information: It is in your best interest to select a reason for this procedure from the list below. All of these items fulfill various CMS LCD requirements except the new and changing color options.
Render Note In Bullet Format When Appropriate: No
Number Of Freeze-Thaw Cycles: 1 freeze-thaw cycle
Detail Level: Detailed
Consent: The patient's consent was obtained including but not limited to risks of crusting, scabbing, blistering, scarring, darker or lighter pigmentary change, recurrence, incomplete removal and infection.
Medical Necessity Clause: This procedure was medically necessary because the lesions that were treated were: rubbed

## 2022-05-18 NOTE — PROCEDURE: OTHER
Detail Level: Zone
Other (Free Text): This was shaved by Dr ARCE in 2000, path showed intradermal nevus. Given size and location within brow, recommended [facility] plastic sx for removal. Info given to patient.
Other (Free Text): Patients area of concern
Note Text (......Xxx Chief Complaint.): This diagnosis correlates with the
Render Risk Assessment In Note?: no

## 2022-09-28 ENCOUNTER — OFFICE VISIT (OUTPATIENT)
Dept: PHYSICAL MEDICINE AND REHAB | Age: 57
End: 2022-09-28
Payer: COMMERCIAL

## 2022-09-28 VITALS
BODY MASS INDEX: 29.32 KG/M2 | HEART RATE: 72 BPM | WEIGHT: 176 LBS | HEIGHT: 65 IN | DIASTOLIC BLOOD PRESSURE: 84 MMHG | SYSTOLIC BLOOD PRESSURE: 123 MMHG

## 2022-09-28 DIAGNOSIS — R20.0 BILATERAL HAND NUMBNESS: Primary | ICD-10-CM

## 2022-09-28 PROCEDURE — 95911 NRV CNDJ TEST 9-10 STUDIES: CPT | Performed by: PHYSICAL MEDICINE & REHABILITATION

## 2022-09-28 PROCEDURE — 95886 MUSC TEST DONE W/N TEST COMP: CPT | Performed by: PHYSICAL MEDICINE & REHABILITATION

## 2022-09-29 ENCOUNTER — HOSPITAL ENCOUNTER (OUTPATIENT)
Dept: LAB | Age: 57
Discharge: HOME | End: 2022-10-02

## 2022-09-29 PROCEDURE — 88305 TISSUE EXAM BY PATHOLOGIST: CPT

## 2022-09-29 PROCEDURE — 88312 SPECIAL STAINS GROUP 1: CPT

## 2022-10-10 ENCOUNTER — HOSPITAL ENCOUNTER (OUTPATIENT)
Dept: MAMMOGRAPHY | Age: 57
Discharge: HOME | End: 2022-10-13

## 2022-10-10 DIAGNOSIS — Z12.31 SCREENING MAMMOGRAM FOR BREAST CANCER: ICD-10-CM

## 2022-10-10 PROCEDURE — 77063 BREAST TOMOSYNTHESIS BI: CPT

## 2022-10-20 ENCOUNTER — HOSPITAL ENCOUNTER (OUTPATIENT)
Dept: MAMMOGRAPHY | Age: 57
Discharge: HOME | End: 2022-10-23
Payer: COMMERCIAL

## 2022-10-20 ENCOUNTER — APPOINTMENT (OUTPATIENT)
Dept: MAMMOGRAPHY | Age: 57
End: 2022-10-20
Payer: COMMERCIAL

## 2022-10-20 DIAGNOSIS — R92.8 ABNORMAL SCREENING MAMMOGRAM: ICD-10-CM

## 2022-10-20 PROCEDURE — 77065 DX MAMMO INCL CAD UNI: CPT

## 2022-10-20 PROCEDURE — 76642 ULTRASOUND BREAST LIMITED: CPT

## 2022-10-31 ENCOUNTER — HOSPITAL ENCOUNTER (OUTPATIENT)
Dept: MAMMOGRAPHY | Age: 57
Discharge: HOME | End: 2022-11-03
Payer: COMMERCIAL

## 2022-10-31 ENCOUNTER — APPOINTMENT (OUTPATIENT)
Dept: MAMMOGRAPHY | Age: 57
End: 2022-10-31
Payer: COMMERCIAL

## 2022-10-31 VITALS — DIASTOLIC BLOOD PRESSURE: 78 MMHG | SYSTOLIC BLOOD PRESSURE: 167 MMHG | HEART RATE: 75 BPM

## 2022-10-31 DIAGNOSIS — R92.8 ABNORMAL FINDING ON MAMMOGRAPHY: ICD-10-CM

## 2022-10-31 DIAGNOSIS — R92.8 ABNORMAL ULTRASOUND OF BREAST: ICD-10-CM

## 2022-10-31 PROCEDURE — 77065 DX MAMMO INCL CAD UNI: CPT

## 2022-10-31 PROCEDURE — 2500000003 HC RX 250 WO HCPCS: Performed by: INTERNAL MEDICINE

## 2022-10-31 PROCEDURE — 88305 TISSUE EXAM BY PATHOLOGIST: CPT

## 2022-10-31 PROCEDURE — 2709999900 US BREAST BIOPSY W LOC DEVICE 1ST LESION RIGHT

## 2022-10-31 RX ORDER — LIDOCAINE HYDROCHLORIDE 10 MG/ML
6 INJECTION, SOLUTION INFILTRATION; PERINEURAL ONCE
Status: COMPLETED | OUTPATIENT
Start: 2022-10-31 | End: 2022-10-31

## 2022-10-31 RX ADMIN — LIDOCAINE HYDROCHLORIDE 6 ML: 10 INJECTION, SOLUTION INFILTRATION; PERINEURAL at 08:27

## 2022-10-31 ASSESSMENT — PAIN DESCRIPTION - INTENSITY
RATING_2: 0
RATING_3: 0

## 2022-10-31 ASSESSMENT — PAIN SCALES - GENERAL: PAINLEVEL_OUTOF10: 2

## 2022-11-01 ENCOUNTER — TELEPHONE (OUTPATIENT)
Dept: MAMMOGRAPHY | Age: 57
End: 2022-11-01

## 2022-12-14 ENCOUNTER — TELEPHONE (OUTPATIENT)
Dept: ONCOLOGY | Age: 57
End: 2022-12-14

## 2022-12-21 ENCOUNTER — HOSPITAL ENCOUNTER (OUTPATIENT)
Dept: INFUSION THERAPY | Age: 57
Discharge: HOME | End: 2022-12-21
Payer: COMMERCIAL

## 2022-12-21 VITALS
SYSTOLIC BLOOD PRESSURE: 148 MMHG | TEMPERATURE: 98 F | HEART RATE: 85 BPM | RESPIRATION RATE: 16 BRPM | DIASTOLIC BLOOD PRESSURE: 83 MMHG | OXYGEN SATURATION: 100 %

## 2022-12-21 PROCEDURE — 2580000003 HC RX 258: Performed by: INTERNAL MEDICINE

## 2022-12-21 PROCEDURE — 96365 THER/PROPH/DIAG IV INF INIT: CPT

## 2022-12-21 PROCEDURE — 6360000002 HC RX W HCPCS: Performed by: INTERNAL MEDICINE

## 2022-12-21 RX ORDER — SODIUM CHLORIDE 0.9 % (FLUSH) 0.9 %
10 SYRINGE (ML) INJECTION PRN
Status: DISCONTINUED | OUTPATIENT
Start: 2022-12-21 | End: 2022-12-22 | Stop reason: HOSPADM

## 2022-12-21 RX ADMIN — FERRIC CARBOXYMALTOSE INJECTION 750 MG: 50 INJECTION, SOLUTION INTRAVENOUS at 11:40

## 2022-12-21 RX ADMIN — SODIUM CHLORIDE, PRESERVATIVE FREE 10 ML: 5 INJECTION INTRAVENOUS at 11:24

## 2022-12-21 NOTE — PROCEDURES
Patient arrived to [place] for [facility]. Assessment completed. No needs voiced at this time. Patient tolerated infusion well and is aware of next appointment on 12/28/2022 @0800. Patient discharged ambulatory.

## 2022-12-28 ENCOUNTER — HOSPITAL ENCOUNTER (OUTPATIENT)
Dept: INFUSION THERAPY | Age: 57
Discharge: HOME | End: 2022-12-28
Payer: COMMERCIAL

## 2022-12-28 VITALS
HEART RATE: 81 BPM | DIASTOLIC BLOOD PRESSURE: 60 MMHG | SYSTOLIC BLOOD PRESSURE: 112 MMHG | TEMPERATURE: 97.7 F | RESPIRATION RATE: 18 BRPM | OXYGEN SATURATION: 100 %

## 2022-12-28 PROCEDURE — 6360000002 HC RX W HCPCS: Performed by: INTERNAL MEDICINE

## 2022-12-28 PROCEDURE — 2580000003 HC RX 258: Performed by: INTERNAL MEDICINE

## 2022-12-28 PROCEDURE — 96365 THER/PROPH/DIAG IV INF INIT: CPT

## 2022-12-28 RX ORDER — SODIUM CHLORIDE 0.9 % (FLUSH) 0.9 %
10 SYRINGE (ML) INJECTION PRN
Status: DISCONTINUED | OUTPATIENT
Start: 2022-12-28 | End: 2022-12-29 | Stop reason: HOSPADM

## 2022-12-28 RX ADMIN — FERRIC CARBOXYMALTOSE INJECTION 750 MG: 50 INJECTION, SOLUTION INTRAVENOUS at 08:33

## 2022-12-28 RX ADMIN — SODIUM CHLORIDE, PRESERVATIVE FREE 10 ML: 5 INJECTION INTRAVENOUS at 08:31

## 2022-12-28 NOTE — PROGRESS NOTES
Patient arrived to [facility]. Assessment completed. No needs voiced at this time. Patient tolerated infusion well. Patient discharged ambulatory.

## 2023-01-03 ENCOUNTER — OFFICE VISIT (OUTPATIENT)
Dept: UROLOGY | Age: 58
End: 2023-01-03
Payer: COMMERCIAL

## 2023-01-03 DIAGNOSIS — N30.10 INTERSTITIAL CYSTITIS (CHRONIC) WITHOUT HEMATURIA: Primary | ICD-10-CM

## 2023-01-03 DIAGNOSIS — R30.0 DYSURIA: ICD-10-CM

## 2023-01-03 DIAGNOSIS — R39.14 FEELING OF INCOMPLETE BLADDER EMPTYING: ICD-10-CM

## 2023-01-03 DIAGNOSIS — R35.0 FREQUENCY OF MICTURITION: ICD-10-CM

## 2023-01-03 DIAGNOSIS — N39.0 URINARY TRACT INFECTION WITHOUT HEMATURIA, SITE UNSPECIFIED: ICD-10-CM

## 2023-01-03 LAB
BILIRUBIN, URINE, POC: NEGATIVE
BLOOD URINE, POC: NEGATIVE
GLUCOSE URINE, POC: 100
KETONES, URINE, POC: NEGATIVE
LEUKOCYTE ESTERASE, URINE, POC: NORMAL
NITRITE, URINE, POC: POSITIVE
PH, URINE, POC: 7 (ref 4.6–8)
PROTEIN,URINE, POC: NORMAL
SPECIFIC GRAVITY, URINE, POC: 1.02 (ref 1–1.03)
URINALYSIS CLARITY, POC: NORMAL
URINALYSIS COLOR, POC: NORMAL
UROBILINOGEN, POC: NORMAL

## 2023-01-03 PROCEDURE — 81003 URINALYSIS AUTO W/O SCOPE: CPT | Performed by: NURSE PRACTITIONER

## 2023-01-03 RX ORDER — NITROFURANTOIN 25; 75 MG/1; MG/1
100 CAPSULE ORAL 2 TIMES DAILY
Qty: 14 CAPSULE | Refills: 0 | Status: SHIPPED | OUTPATIENT
Start: 2023-01-03 | End: 2023-01-06 | Stop reason: ALTCHOICE

## 2023-01-03 NOTE — PROGRESS NOTES
[facility]  [address] [place] [address], [facility] [address]  [phone]          Felicita Melo  : 1965    Chief Complaint   Patient presents with    Urinary Tract Infection          HPI     Felicita Melo is a 56 y.o. female          Past Medical History:   Diagnosis Date    Difficult intubation     2021 Betty, CRNA had trouble intubating and anesthesiologist had to place- however has never been a problem in previous proceduress    Elevated cholesterol     GERD (gastroesophageal reflux disease)     conversion surgery 2021 due to severe GERD with g-sleeve- omeprazole daily- well controlled - 1 pillow now    Hypertension     Menopause     Mood disorder [facility])     Surgeon started pt on prior to G-sleeve surgery,     PONV (postoperative nausea and vomiting)     severe with Gastric sleeve conversion surgery 2021- none prior    Sleep apnea     off CPAP since gastric bypass/ has not been re-evaluated    Thyroid disease     hyperthyroidism-   thyroid removed, levothyroxine daily     Past Surgical History:   Procedure Laterality Date    BREAST BIOPSY Bilateral     benign     SECTION       x 2    CHOLECYSTECTOMY      GASTRECTOMY  2016    Sleeve    GASTRIC BYPASS SURGERY  2021    HYSTERECTOMY (CERVIX STATUS UNKNOWN)      OVARY REMOVAL      unilateral    SINUS SURGERY PROC UNLISTED      SINUS SURGERY PROC UNLISTED  early     THYROIDECTOMY  2008    TONSILLECTOMY      US BREAST NEEDLE BIOPSY RIGHT Right 10/31/2022    US BREAST NEEDLE BIOPSY RIGHT 10/31/2022 [facility]     Current Outpatient Medications   Medication Sig Dispense Refill    nitrofurantoin, macrocrystal-monohydrate, (MACROBID) 100 MG capsule Take 1 capsule by mouth 2 times daily for 7 days 14 capsule 0    atorvastatin (LIPITOR) 40 MG tablet Take 40 mg by mouth every morning      DULoxetine (CYMBALTA) 30 MG extended release capsule Take 20 mg by mouth every morning      fexofenadine (ALLEGRA) 180 MG tablet Take 180 mg by mouth      levothyroxine (SYNTHROID) 150 MCG tablet Take 125 mcg by mouth every morning (before breakfast)      montelukast (SINGULAIR) 10 MG tablet Take 10 mg by mouth      omeprazole (PRILOSEC) 10 MG delayed release capsule Take 10 mg by mouth every morning      phenazopyridine (PYRIDIUM) 200 MG tablet Take by mouth 3 times daily as needed      tolterodine (DETROL LA) 4 MG extended release capsule TAKE 1 CAPSULE BY MOUTH EVERY DAY      triamcinolone (NASACORT) 55 MCG/ACT nasal inhaler SPRAY 1 SPRAY INTO EACH NOSTRIL EVERY DAY       No current facility-administered medications for this visit.      Allergies   Allergen Reactions    Ciprofloxacin Other (See Comments)     Possible leukocytoclastic vascultitis    Ibuprofen Other (See Comments)     LEUKOCYTOCLASTIC VASULITIS    Lisinopril Other (See Comments)     Possible leukocytoclastic vascultitis    Losartan Other (See Comments)     Vasculitus    Naproxen Sodium Other (See Comments)     Possible leukocytoclastic vascultitis    Nebivolol Other (See Comments)     Possible leukocytoclastic vascultitis    Pravastatin Other (See Comments)     Possible leukocytoclastic vascultitis    Codeine Nausea And Vomiting     Social History     Socioeconomic History    Marital status:      Spouse name: Not on file    Number of children: Not on file    Years of education: Not on file    Highest education level: Not on file   Occupational History    Not on file   Tobacco Use    Smoking status: Never    Smokeless tobacco: Never   Substance and Sexual Activity    Alcohol use: No    Drug use: No    Sexual activity: Not on file   Other Topics Concern    Not on file   Social History Narrative    Not on file     Social Determinants of Health     Financial Resource Strain: Not on file   Food Insecurity: Not on file   Transportation Needs: Not on file   Physical Activity: Not on file   Stress: Not on file   Social Connections: Not on file Intimate Partner Violence: Not on file   Housing Stability: Not on file     Family History   Adopted: Yes   Family history unknown: Yes       ROS    Urinalysis  UA - Dipstick  Results for orders placed or performed in visit on 01/03/23   AMB POC URINALYSIS DIP STICK AUTO W/O MICRO   Result Value Ref Range    Color (UA POC)      Clarity (UA POC)      Glucose, Urine,  Negative    Bilirubin, Urine, POC Negative Negative    KETONES, Urine, POC Negative Negative    Specific Gravity, Urine, POC 1.025 1.001 - 1.035    Blood (UA POC) Negative Negative    pH, Urine, POC 7.0 4.6 - 8.0    Protein, Urine, POC Trace Negative    Urobilinogen, POC Normal     Nitrite, Urine, POC Positive Negative    Leukocyte Esterase, Urine, POC Trace Negative       UA - Micro  WBC - 0  RBC - 0  Bacteria - 0  Epith - 0        Assessment and Plan    ICD-10-CM    1. Interstitial cystitis (chronic) without hematuria  N30.10 AMB POC URINALYSIS DIP STICK AUTO W/O MICRO      2. Frequency of micturition  R35.0 AMB POC URINALYSIS DIP STICK AUTO W/O MICRO      3. Dysuria  R30.0 AMB POC URINALYSIS DIP STICK AUTO W/O MICRO      4. Feeling of incomplete bladder emptying  R39.14 AMB POC URINALYSIS DIP STICK AUTO W/O MICRO      5. Urinary tract infection without hematuria, site unspecified  N39.0 Culture, Urine        PLAN:  Culture the urine  Macrobid BID for 7days. Pt will need appt after completion of medication.  It has been over a year since her last visit

## 2023-01-06 ENCOUNTER — PATIENT MESSAGE (OUTPATIENT)
Dept: UROLOGY | Age: 58
End: 2023-01-06

## 2023-01-06 LAB
BACTERIA SPEC CULT: ABNORMAL
SERVICE CMNT-IMP: ABNORMAL

## 2023-01-06 RX ORDER — SULFAMETHOXAZOLE AND TRIMETHOPRIM 800; 160 MG/1; MG/1
1 TABLET ORAL 2 TIMES DAILY
Qty: 14 TABLET | Refills: 0 | Status: SHIPPED | OUTPATIENT
Start: 2023-01-06 | End: 2023-01-13

## 2023-01-06 NOTE — TELEPHONE ENCOUNTER
From: Carmenza Scott  To: Mikael Davis  Sent: 1/6/2023 10:04 AM EST  Subject: UTI culture    Is the antibiotic prescribed what is needed based on the UTI culture results of Klebsiella Pneumoniae?

## 2023-01-23 ENCOUNTER — OFFICE VISIT (OUTPATIENT)
Dept: UROLOGY | Age: 58
End: 2023-01-23
Payer: COMMERCIAL

## 2023-01-23 DIAGNOSIS — N30.10 INTERSTITIAL CYSTITIS (CHRONIC) WITHOUT HEMATURIA: Primary | ICD-10-CM

## 2023-01-23 DIAGNOSIS — R35.0 URINARY FREQUENCY: ICD-10-CM

## 2023-01-23 DIAGNOSIS — N39.0 URINARY TRACT INFECTION WITHOUT HEMATURIA, SITE UNSPECIFIED: ICD-10-CM

## 2023-01-23 LAB
BILIRUBIN, URINE, POC: NEGATIVE
BLOOD URINE, POC: NEGATIVE
GLUCOSE URINE, POC: 100
KETONES, URINE, POC: NEGATIVE
LEUKOCYTE ESTERASE, URINE, POC: NEGATIVE
NITRITE, URINE, POC: NEGATIVE
PH, URINE, POC: 6 (ref 4.6–8)
PROTEIN,URINE, POC: NEGATIVE
SPECIFIC GRAVITY, URINE, POC: 1.01 (ref 1–1.03)
URINALYSIS CLARITY, POC: NORMAL
URINALYSIS COLOR, POC: NORMAL
UROBILINOGEN, POC: NORMAL

## 2023-01-23 PROCEDURE — 81003 URINALYSIS AUTO W/O SCOPE: CPT | Performed by: NURSE PRACTITIONER

## 2023-01-23 PROCEDURE — 99214 OFFICE O/P EST MOD 30 MIN: CPT | Performed by: NURSE PRACTITIONER

## 2023-01-23 RX ORDER — HYDROXYCHLOROQUINE SULFATE 200 MG/1
400 TABLET, FILM COATED ORAL DAILY
COMMUNITY

## 2023-01-23 RX ORDER — TRAMADOL HYDROCHLORIDE 50 MG/1
50 TABLET ORAL EVERY 6 HOURS PRN
COMMUNITY
Start: 2022-04-13

## 2023-01-23 RX ORDER — TOLTERODINE 4 MG/1
4 CAPSULE, EXTENDED RELEASE ORAL DAILY
Qty: 90 CAPSULE | Refills: 3 | Status: SHIPPED | OUTPATIENT
Start: 2023-01-23

## 2023-01-23 RX ORDER — PHENAZOPYRIDINE HYDROCHLORIDE 200 MG/1
200 TABLET, FILM COATED ORAL 3 TIMES DAILY PRN
Qty: 30 TABLET | Refills: 5 | Status: SHIPPED | OUTPATIENT
Start: 2023-01-23

## 2023-01-23 RX ORDER — AZATHIOPRINE 50 MG/1
TABLET ORAL
COMMUNITY
Start: 2022-01-26

## 2023-01-23 ASSESSMENT — ENCOUNTER SYMPTOMS
NAUSEA: 0
BACK PAIN: 0

## 2023-01-23 NOTE — PROGRESS NOTES
[place] [address] [place] [address], [facility] [address]  [phone]          Felicita Melo  : 1965    Chief Complaint   Patient presents with    Follow-up          HPI     Felicita Melo is a 56 y.o. female    History of interstitial cystitis. Last visit here was 2021. She says she has been doing well. Her cystitis has overall been under control. She watches her diet and drinks plenty of fluids. She uses the Pyridium only as needed. She is also on the Detrol LA and it does help with urgency and frequency. She brought a urine earlier in the month due to possible infection. The urine culture was obtained and it did show Klebsiella. She initially was given nitrofurantoin, however the culture showed this to be indeterminate. She discontinued the nitrofurantoin and I sent in Bactrim. She has completed that antibiotic. She tells me that during the infection she did see some blood on her tissue paper. That has completely cleared and her urine today is actually clear.       Past Medical History:   Diagnosis Date    Difficult intubation     2021 Betty CRNA had trouble intubating and anesthesiologist had to place- however has never been a problem in previous proceduress    Elevated cholesterol     GERD (gastroesophageal reflux disease)     conversion surgery 2021 due to severe GERD with g-sleeve- omeprazole daily- well controlled - 1 pillow now    Hypertension     Menopause     Mood disorder [facility])     Surgeon started pt on prior to G-sleeve surgery,     PONV (postoperative nausea and vomiting)     severe with Gastric sleeve conversion surgery 2021- none prior    Sleep apnea     off CPAP since gastric bypass/ has not been re-evaluated    Thyroid disease     hyperthyroidism-   thyroid removed, levothyroxine daily     Past Surgical History:   Procedure Laterality Date    BREAST BIOPSY Bilateral     benign     SECTION       x 2    CHOLECYSTECTOMY GASTRECTOMY  05/2016    Sleeve    GASTRIC BYPASS SURGERY  02/2021    HYSTERECTOMY (CERVIX STATUS UNKNOWN)  2001    OVARY REMOVAL  2004    unilateral    SINUS SURGERY PROC UNLISTED  1990s    SINUS SURGERY PROC UNLISTED  early 2000    THYROIDECTOMY  2008    TONSILLECTOMY  2001    US BREAST BIOPSY W LOC DEVICE 1ST LESION RIGHT Right 10/31/2022    US BREAST NEEDLE BIOPSY RIGHT 10/31/2022 [facility]     Current Outpatient Medications   Medication Sig Dispense Refill    hydroxychloroquine (PLAQUENIL) 200 MG tablet Take 400 mg by mouth daily      azaTHIOprine (IMURAN) 50 MG tablet       traMADol (ULTRAM) 50 MG tablet Take 50 mg by mouth every 6 hours as needed. tolterodine (DETROL LA) 4 MG extended release capsule Take 1 capsule by mouth daily 90 capsule 3    phenazopyridine (PYRIDIUM) 200 MG tablet Take 1 tablet by mouth 3 times daily as needed for Pain 30 tablet 5    atorvastatin (LIPITOR) 40 MG tablet Take 40 mg by mouth every morning      DULoxetine (CYMBALTA) 30 MG extended release capsule Take 20 mg by mouth every morning      fexofenadine (ALLEGRA) 180 MG tablet Take 180 mg by mouth      levothyroxine (SYNTHROID) 150 MCG tablet Take 125 mcg by mouth every morning (before breakfast)      montelukast (SINGULAIR) 10 MG tablet Take 10 mg by mouth      omeprazole (PRILOSEC) 10 MG delayed release capsule Take 10 mg by mouth every morning      phenazopyridine (PYRIDIUM) 200 MG tablet Take by mouth 3 times daily as needed      tolterodine (DETROL LA) 4 MG extended release capsule TAKE 1 CAPSULE BY MOUTH EVERY DAY      triamcinolone (NASACORT) 55 MCG/ACT nasal inhaler SPRAY 1 SPRAY INTO EACH NOSTRIL EVERY DAY       No current facility-administered medications for this visit.      Allergies   Allergen Reactions    Ciprofloxacin Other (See Comments)     Possible leukocytoclastic vascultitis    Ibuprofen Other (See Comments)     LEUKOCYTOCLASTIC VASULITIS    Lisinopril Other (See Comments)     Possible leukocytoclastic vascultitis    Losartan Other (See Comments)     Vasculitus    Naproxen Sodium Other (See Comments)     Possible leukocytoclastic vascultitis    Nebivolol Other (See Comments)     Possible leukocytoclastic vascultitis    Pravastatin Other (See Comments)     Possible leukocytoclastic vascultitis    Codeine Nausea And Vomiting     Social History     Socioeconomic History    Marital status:      Spouse name: Not on file    Number of children: Not on file    Years of education: Not on file    Highest education level: Not on file   Occupational History    Not on file   Tobacco Use    Smoking status: Never    Smokeless tobacco: Never   Substance and Sexual Activity    Alcohol use: No    Drug use: No    Sexual activity: Not on file   Other Topics Concern    Not on file   Social History Narrative    Not on file     Social Determinants of Health     Financial Resource Strain: Not on file   Food Insecurity: Not on file   Transportation Needs: Not on file   Physical Activity: Not on file   Stress: Not on file   Social Connections: Not on file   Intimate Partner Violence: Not on file   Housing Stability: Not on file     Family History   Adopted: Yes   Family history unknown: Yes       Review of Systems  Constitutional:   Negative for fever. GI:  Negative for nausea. Musculoskeletal:  Negative for back pain.     Urinalysis  UA - Dipstick  Results for orders placed or performed in visit on 01/23/23   AMB POC URINALYSIS DIP STICK AUTO W/O MICRO   Result Value Ref Range    Color (UA POC)      Clarity (UA POC)      Glucose, Urine,  Negative    Bilirubin, Urine, POC Negative Negative    KETONES, Urine, POC Negative Negative    Specific Gravity, Urine, POC 1.015 1.001 - 1.035    Blood (UA POC) Negative Negative    pH, Urine, POC 6.0 4.6 - 8.0    Protein, Urine, POC Negative Negative    Urobilinogen, POC 0.2 mg/dL     Nitrite, Urine, POC Negative Negative    Leukocyte Esterase, Urine, POC Negative Negative     PHYSICAL EXAM    GENERAL: No acute distress, Awake, Alert, Oriented X 3, Gait normal  ABDOMEN: soft, non tender, non-distended, positive bowel sounds, no organomegaly, no palpable masses, no guarding, no rebound tenderness  SKIN: No rash, no erythema, no lacerations or abrasions, no ecchymosis  MUSCULOSKELETAL - MAEW, no edema       Assessment and Plan    ICD-10-CM    1. Interstitial cystitis (chronic) without hematuria  N30.10 AMB POC URINALYSIS DIP STICK AUTO W/O MICRO      2. Urinary tract infection without hematuria, site unspecified  N39.0       3. Urinary frequency  R35.0         UTI-  Appears to have cleared  I will send in culture    IC-  Stable with prn use of pyridium . Urinary frequency  Stable with daily use of detrol LA    Maura JEREMY AhmadiN - NP  Dr. Jose Jorge is supervising physician today and he approves plan of care. Return in about 8 months (around 9/23/2023) for for recheck. Elements of this note have been dictated using speech recognition software. Although reviewed, errors of speech recognition may have occurred.

## 2023-06-29 ENCOUNTER — HOSPITAL ENCOUNTER (OUTPATIENT)
Dept: MAMMOGRAPHY | Age: 58
End: 2023-06-29
Payer: COMMERCIAL

## 2023-06-29 ENCOUNTER — HOSPITAL ENCOUNTER (OUTPATIENT)
Dept: MAMMOGRAPHY | Age: 58
Discharge: HOME | End: 2023-06-29
Payer: COMMERCIAL

## 2023-06-29 DIAGNOSIS — R92.8 ABNORMAL MAMMOGRAM: ICD-10-CM

## 2023-06-29 PROCEDURE — 76642 ULTRASOUND BREAST LIMITED: CPT

## 2023-06-29 PROCEDURE — G0279 TOMOSYNTHESIS, MAMMO: HCPCS

## 2023-07-12 ENCOUNTER — HOSPITAL ENCOUNTER (OUTPATIENT)
Dept: MAMMOGRAPHY | Age: 58
Discharge: HOME | End: 2023-07-15
Payer: COMMERCIAL

## 2023-07-12 VITALS
TEMPERATURE: 96.6 F | OXYGEN SATURATION: 99 % | HEART RATE: 79 BPM | SYSTOLIC BLOOD PRESSURE: 126 MMHG | DIASTOLIC BLOOD PRESSURE: 82 MMHG

## 2023-07-12 DIAGNOSIS — R92.8 ABNORMAL FINDING ON MAMMOGRAPHY: ICD-10-CM

## 2023-07-12 PROCEDURE — 76098 X-RAY EXAM SURGICAL SPECIMEN: CPT

## 2023-07-12 PROCEDURE — 2500000003 HC RX 250 WO HCPCS: Performed by: INTERNAL MEDICINE

## 2023-07-12 PROCEDURE — A4217 STERILE WATER/SALINE, 500 ML: HCPCS | Performed by: INTERNAL MEDICINE

## 2023-07-12 PROCEDURE — 2580000003 HC RX 258: Performed by: INTERNAL MEDICINE

## 2023-07-12 PROCEDURE — 19081 BX BREAST 1ST LESION STRTCTC: CPT

## 2023-07-12 PROCEDURE — 88305 TISSUE EXAM BY PATHOLOGIST: CPT

## 2023-07-12 PROCEDURE — 77065 DX MAMMO INCL CAD UNI: CPT

## 2023-07-12 RX ORDER — MAGNESIUM HYDROXIDE 1200 MG/15ML
250 LIQUID ORAL ONCE
Status: COMPLETED | OUTPATIENT
Start: 2023-07-12 | End: 2023-07-12

## 2023-07-12 RX ORDER — LIDOCAINE HYDROCHLORIDE 10 MG/ML
5 INJECTION, SOLUTION INFILTRATION; PERINEURAL ONCE
Status: COMPLETED | OUTPATIENT
Start: 2023-07-12 | End: 2023-07-12

## 2023-07-12 RX ORDER — LIDOCAINE HYDROCHLORIDE AND EPINEPHRINE 10; 10 MG/ML; UG/ML
20 INJECTION, SOLUTION INFILTRATION; PERINEURAL ONCE
Status: COMPLETED | OUTPATIENT
Start: 2023-07-12 | End: 2023-07-12

## 2023-07-12 RX ADMIN — LIDOCAINE HYDROCHLORIDE,EPINEPHRINE BITARTRATE 10 ML: 10; .01 INJECTION, SOLUTION INFILTRATION; PERINEURAL at 11:39

## 2023-07-12 RX ADMIN — SODIUM CHLORIDE 250 ML: 900 IRRIGANT IRRIGATION at 11:41

## 2023-07-12 RX ADMIN — LIDOCAINE HYDROCHLORIDE 5 ML: 10 INJECTION, SOLUTION INFILTRATION; PERINEURAL at 11:40

## 2023-07-12 ASSESSMENT — PAIN SCALES - GENERAL: PAINLEVEL_OUTOF10: 2

## 2023-07-13 ENCOUNTER — TELEPHONE (OUTPATIENT)
Dept: MAMMOGRAPHY | Age: 58
End: 2023-07-13

## 2023-09-21 ENCOUNTER — OFFICE VISIT (OUTPATIENT)
Dept: UROLOGY | Age: 58
End: 2023-09-21
Payer: COMMERCIAL

## 2023-09-21 DIAGNOSIS — R35.0 URINARY FREQUENCY: ICD-10-CM

## 2023-09-21 DIAGNOSIS — N30.10 INTERSTITIAL CYSTITIS (CHRONIC) WITHOUT HEMATURIA: Primary | ICD-10-CM

## 2023-09-21 LAB
BILIRUBIN, URINE, POC: NEGATIVE
BLOOD URINE, POC: NORMAL
GLUCOSE URINE, POC: NEGATIVE
KETONES, URINE, POC: NEGATIVE
LEUKOCYTE ESTERASE, URINE, POC: NEGATIVE
NITRITE, URINE, POC: NEGATIVE
PH, URINE, POC: 6.5 (ref 4.6–8)
PROTEIN,URINE, POC: NEGATIVE
SPECIFIC GRAVITY, URINE, POC: 1.01 (ref 1–1.03)
URINALYSIS CLARITY, POC: NORMAL
URINALYSIS COLOR, POC: NORMAL
UROBILINOGEN, POC: NORMAL

## 2023-09-21 PROCEDURE — 99213 OFFICE O/P EST LOW 20 MIN: CPT | Performed by: NURSE PRACTITIONER

## 2023-09-21 PROCEDURE — 81003 URINALYSIS AUTO W/O SCOPE: CPT | Performed by: NURSE PRACTITIONER

## 2023-09-21 RX ORDER — PHENAZOPYRIDINE HYDROCHLORIDE 200 MG/1
200 TABLET, FILM COATED ORAL 3 TIMES DAILY PRN
Qty: 30 TABLET | Refills: 5 | Status: SHIPPED | OUTPATIENT
Start: 2023-09-21

## 2023-09-21 RX ORDER — MEDROXYPROGESTERONE ACETATE 150 MG/ML
INJECTION, SUSPENSION INTRAMUSCULAR
COMMUNITY
Start: 2023-09-19

## 2023-09-21 RX ORDER — TOLTERODINE 4 MG/1
4 CAPSULE, EXTENDED RELEASE ORAL DAILY
Qty: 90 CAPSULE | Refills: 3 | Status: SHIPPED | OUTPATIENT
Start: 2023-09-21

## 2023-09-21 ASSESSMENT — ENCOUNTER SYMPTOMS
BACK PAIN: 0
NAUSEA: 0

## 2023-09-21 NOTE — PROGRESS NOTES
2008    TONSILLECTOMY  2001    US BREAST BIOPSY W LOC DEVICE 1ST LESION RIGHT Right 10/31/2022    US BREAST NEEDLE BIOPSY RIGHT 10/31/2022 [facility]     Current Outpatient Medications   Medication Sig Dispense Refill    ENBREL SURECLICK 50 MG/ML SOAJ       phenazopyridine (PYRIDIUM) 200 MG tablet Take 1 tablet by mouth 3 times daily as needed for Pain 30 tablet 5    tolterodine (DETROL LA) 4 MG extended release capsule Take 1 capsule by mouth daily 90 capsule 3    hydroxychloroquine (PLAQUENIL) 200 MG tablet Take 2 tablets by mouth daily      azaTHIOprine (IMURAN) 50 MG tablet       traMADol (ULTRAM) 50 MG tablet Take 1 tablet by mouth every 6 hours as needed. atorvastatin (LIPITOR) 40 MG tablet Take 1 tablet by mouth every morning      DULoxetine (CYMBALTA) 30 MG extended release capsule Take 20 mg by mouth every morning      fexofenadine (ALLEGRA) 180 MG tablet Take 1 tablet by mouth      levothyroxine (SYNTHROID) 150 MCG tablet Take 125 mcg by mouth every morning (before breakfast)      montelukast (SINGULAIR) 10 MG tablet Take 1 tablet by mouth      omeprazole (PRILOSEC) 10 MG delayed release capsule Take 1 capsule by mouth every morning      phenazopyridine (PYRIDIUM) 200 MG tablet Take by mouth 3 times daily as needed      tolterodine (DETROL LA) 4 MG extended release capsule TAKE 1 CAPSULE BY MOUTH EVERY DAY      triamcinolone (NASACORT) 55 MCG/ACT nasal inhaler SPRAY 1 SPRAY INTO EACH NOSTRIL EVERY DAY       No current facility-administered medications for this visit.      Allergies   Allergen Reactions    Ciprofloxacin Other (See Comments)     Possible leukocytoclastic vascultitis    Ibuprofen Other (See Comments)     LEUKOCYTOCLASTIC VASULITIS    Lisinopril Other (See Comments)     Possible leukocytoclastic vascultitis    Losartan Other (See Comments)     Vasculitus    Naproxen Sodium Other (See Comments)     Possible leukocytoclastic vascultitis    Nebivolol Other (See Comments)     Possible

## 2024-06-06 ENCOUNTER — APPOINTMENT (OUTPATIENT)
Dept: CT IMAGING | Age: 59
End: 2024-06-06
Payer: COMMERCIAL

## 2024-06-06 ENCOUNTER — HOSPITAL ENCOUNTER (EMERGENCY)
Age: 59
Discharge: HOME | End: 2024-06-06
Attending: STUDENT IN AN ORGANIZED HEALTH CARE EDUCATION/TRAINING PROGRAM
Payer: COMMERCIAL

## 2024-06-06 VITALS
TEMPERATURE: 97.7 F | SYSTOLIC BLOOD PRESSURE: 123 MMHG | OXYGEN SATURATION: 100 % | DIASTOLIC BLOOD PRESSURE: 83 MMHG | RESPIRATION RATE: 16 BRPM | HEART RATE: 66 BPM | HEIGHT: 65 IN | BODY MASS INDEX: 29.99 KG/M2 | WEIGHT: 180 LBS

## 2024-06-06 DIAGNOSIS — R20.2 PARESTHESIAS: ICD-10-CM

## 2024-06-06 DIAGNOSIS — R51.9 ACUTE NONINTRACTABLE HEADACHE, UNSPECIFIED HEADACHE TYPE: Primary | ICD-10-CM

## 2024-06-06 LAB
ALBUMIN SERPL-MCNC: 3.9 G/DL (ref 3.5–5)
ALBUMIN/GLOB SERPL: 1.6 (ref 1–1.9)
ALP SERPL-CCNC: 111 U/L (ref 35–104)
ALT SERPL-CCNC: 32 U/L (ref 12–65)
ANION GAP SERPL CALC-SCNC: 10 MMOL/L (ref 9–18)
AST SERPL-CCNC: 33 U/L (ref 15–37)
BASOPHILS # BLD: 0.1 K/UL (ref 0–0.2)
BASOPHILS NFR BLD: 1 % (ref 0–2)
BILIRUB SERPL-MCNC: 0.4 MG/DL (ref 0–1.2)
BUN SERPL-MCNC: 7 MG/DL (ref 6–23)
CALCIUM SERPL-MCNC: 9.3 MG/DL (ref 8.8–10.2)
CHLORIDE SERPL-SCNC: 105 MMOL/L (ref 98–107)
CO2 SERPL-SCNC: 27 MMOL/L (ref 20–28)
CREAT SERPL-MCNC: 0.64 MG/DL (ref 0.6–1.1)
DIFFERENTIAL METHOD BLD: ABNORMAL
EOSINOPHIL # BLD: 0.4 K/UL (ref 0–0.8)
EOSINOPHIL NFR BLD: 7 % (ref 0.5–7.8)
ERYTHROCYTE [DISTWIDTH] IN BLOOD BY AUTOMATED COUNT: 13.2 % (ref 11.9–14.6)
GLOBULIN SER CALC-MCNC: 2.4 G/DL (ref 2.3–3.5)
GLUCOSE SERPL-MCNC: 83 MG/DL (ref 70–99)
HCT VFR BLD AUTO: 42.7 % (ref 35.8–46.3)
HGB BLD-MCNC: 14 G/DL (ref 11.7–15.4)
IMM GRANULOCYTES # BLD AUTO: 0 K/UL (ref 0–0.5)
IMM GRANULOCYTES NFR BLD AUTO: 1 % (ref 0–5)
LYMPHOCYTES # BLD: 0.9 K/UL (ref 0.5–4.6)
LYMPHOCYTES NFR BLD: 14 % (ref 13–44)
MCH RBC QN AUTO: 31.7 PG (ref 26.1–32.9)
MCHC RBC AUTO-ENTMCNC: 32.8 G/DL (ref 31.4–35)
MCV RBC AUTO: 96.6 FL (ref 82–102)
MONOCYTES # BLD: 1.1 K/UL (ref 0.1–1.3)
MONOCYTES NFR BLD: 16 % (ref 4–12)
NEUTS SEG # BLD: 3.9 K/UL (ref 1.7–8.2)
NEUTS SEG NFR BLD: 61 % (ref 43–78)
NRBC # BLD: 0 K/UL (ref 0–0.2)
PLATELET # BLD AUTO: 328 K/UL (ref 150–450)
PMV BLD AUTO: 10.4 FL (ref 9.4–12.3)
POTASSIUM SERPL-SCNC: 4.2 MMOL/L (ref 3.5–5.1)
PROT SERPL-MCNC: 6.4 G/DL (ref 6.3–8.2)
RBC # BLD AUTO: 4.42 M/UL (ref 4.05–5.2)
SODIUM SERPL-SCNC: 142 MMOL/L (ref 136–145)
WBC # BLD AUTO: 6.4 K/UL (ref 4.3–11.1)

## 2024-06-06 PROCEDURE — 6360000002 HC RX W HCPCS: Performed by: STUDENT IN AN ORGANIZED HEALTH CARE EDUCATION/TRAINING PROGRAM

## 2024-06-06 PROCEDURE — 2580000003 HC RX 258: Performed by: STUDENT IN AN ORGANIZED HEALTH CARE EDUCATION/TRAINING PROGRAM

## 2024-06-06 PROCEDURE — 80053 COMPREHEN METABOLIC PANEL: CPT

## 2024-06-06 PROCEDURE — 6370000000 HC RX 637 (ALT 250 FOR IP): Performed by: STUDENT IN AN ORGANIZED HEALTH CARE EDUCATION/TRAINING PROGRAM

## 2024-06-06 PROCEDURE — 85025 COMPLETE CBC W/AUTO DIFF WBC: CPT

## 2024-06-06 PROCEDURE — 96361 HYDRATE IV INFUSION ADD-ON: CPT

## 2024-06-06 PROCEDURE — 99284 EMERGENCY DEPT VISIT MOD MDM: CPT

## 2024-06-06 PROCEDURE — 96374 THER/PROPH/DIAG INJ IV PUSH: CPT

## 2024-06-06 PROCEDURE — 70450 CT HEAD/BRAIN W/O DYE: CPT

## 2024-06-06 RX ORDER — METOCLOPRAMIDE HYDROCHLORIDE 5 MG/ML
10 INJECTION INTRAMUSCULAR; INTRAVENOUS ONCE
Status: COMPLETED | OUTPATIENT
Start: 2024-06-06 | End: 2024-06-06

## 2024-06-06 RX ORDER — 0.9 % SODIUM CHLORIDE 0.9 %
1000 INTRAVENOUS SOLUTION INTRAVENOUS
Status: COMPLETED | OUTPATIENT
Start: 2024-06-06 | End: 2024-06-06

## 2024-06-06 RX ORDER — ACETAMINOPHEN 500 MG
1000 TABLET ORAL
Status: COMPLETED | OUTPATIENT
Start: 2024-06-06 | End: 2024-06-06

## 2024-06-06 RX ADMIN — METOCLOPRAMIDE 10 MG: 5 INJECTION, SOLUTION INTRAMUSCULAR; INTRAVENOUS at 15:31

## 2024-06-06 RX ADMIN — SODIUM CHLORIDE 1000 ML: 9 INJECTION, SOLUTION INTRAVENOUS at 15:31

## 2024-06-06 RX ADMIN — ACETAMINOPHEN 1000 MG: 500 TABLET, FILM COATED ORAL at 15:30

## 2024-06-06 ASSESSMENT — PAIN - FUNCTIONAL ASSESSMENT
PAIN_FUNCTIONAL_ASSESSMENT: 0-10
PAIN_FUNCTIONAL_ASSESSMENT: 0-10

## 2024-06-06 ASSESSMENT — PAIN SCALES - GENERAL
PAINLEVEL_OUTOF10: 7
PAINLEVEL_OUTOF10: 5
PAINLEVEL_OUTOF10: 0

## 2024-06-06 ASSESSMENT — PAIN DESCRIPTION - DESCRIPTORS: DESCRIPTORS: ACHING;DISCOMFORT

## 2024-06-06 ASSESSMENT — LIFESTYLE VARIABLES
HOW OFTEN DO YOU HAVE A DRINK CONTAINING ALCOHOL: MONTHLY OR LESS
HOW MANY STANDARD DRINKS CONTAINING ALCOHOL DO YOU HAVE ON A TYPICAL DAY: 1 OR 2

## 2024-06-06 ASSESSMENT — PAIN DESCRIPTION - PAIN TYPE: TYPE: ACUTE PAIN

## 2024-06-06 ASSESSMENT — PAIN DESCRIPTION - LOCATION: LOCATION: HEAD

## 2024-06-06 ASSESSMENT — PAIN DESCRIPTION - FREQUENCY: FREQUENCY: CONTINUOUS

## 2024-06-06 NOTE — ED PROVIDER NOTES
TOLTERODINE (DETROL LA) 4 MG EXTENDED RELEASE CAPSULE    TAKE 1 CAPSULE BY MOUTH EVERY DAY    TOLTERODINE (DETROL LA) 4 MG EXTENDED RELEASE CAPSULE    Take 1 capsule by mouth daily    TRAMADOL (ULTRAM) 50 MG TABLET    Take 1 tablet by mouth every 6 hours as needed.    TRIAMCINOLONE (NASACORT) 55 MCG/ACT NASAL INHALER    SPRAY 1 SPRAY INTO EACH NOSTRIL EVERY DAY        Results for orders placed or performed during the hospital encounter of 06/06/24   CT HEAD WO CONTRAST    Narrative    Head CT    INDICATION: Headache    TECHNIQUE: Multiple 2D axial images obtained through the brain without  intravenous contrast.  Radiation dose reduction techniques were used for this  study:  All CT scans performed at this facility use one or all of the following:  Automated exposure control, adjustment of the mA and/or kVp according to  patient's size, iterative reconstruction.    COMPARISON: None    FINDINGS: No areas of abnormal attenuation are seen in the brain. There is no CT  evidence of acute hemorrhage or infarction. The ventricles are normal in size.  There are no extra-axial fluid collections. No masses are seen. The sinuses are  clear. There are no bony lesions.      Impression    No CT evidence of acute intracranial abnormality.    Electronically signed by RONAL BURGESS   CBC with Auto Differential   Result Value Ref Range    WBC 6.4 4.3 - 11.1 K/uL    RBC 4.42 4.05 - 5.2 M/uL    Hemoglobin 14.0 11.7 - 15.4 g/dL    Hematocrit 42.7 35.8 - 46.3 %    MCV 96.6 82 - 102 FL    MCH 31.7 26.1 - 32.9 PG    MCHC 32.8 31.4 - 35.0 g/dL    RDW 13.2 11.9 - 14.6 %    Platelets 328 150 - 450 K/uL    MPV 10.4 9.4 - 12.3 FL    nRBC 0.00 0.0 - 0.2 K/uL    Differential Type AUTOMATED      Neutrophils % 61 43 - 78 %    Lymphocytes % 14 13 - 44 %    Monocytes % 16 (H) 4.0 - 12.0 %    Eosinophils % 7 0.5 - 7.8 %    Basophils % 1 0.0 - 2.0 %    Immature Granulocytes % 1 0.0 - 5.0 %    Neutrophils Absolute 3.9 1.7 - 8.2 K/UL    Lymphocytes

## 2024-06-06 NOTE — ED NOTES
Patient mobility status  with no difficulty. Provider aware     I have reviewed discharge instructions with the patient.  The patient verbalized understanding.    Patient left ED via Discharge Method: ambulatory to Home with  self .    Opportunity for questions and clarification provided.     Patient given 0 scripts.           Petar Harris RN  06/06/24 [id number]

## 2024-06-06 NOTE — ED TRIAGE NOTES
Pt reports since woke up has been having numbness to bilateral hands and around mouth.  Pt also endorses headache since 1000 not worst ever, improved with some tylenol.   (-)facial asymmetry, weakness, drift, aphasia, dysarthria   Pupils equal and reactive to light    A&Ox4      Symptoms reviewed with MD [place] for need to assess

## 2024-06-06 NOTE — DISCHARGE INSTRUCTIONS
Stay orally hydrated with clear liquids.  Take Tylenol if your headache returns.  Follow-up with primary care physician within 1 week.  Return to the ER for worsening or worrisome symptoms

## 2024-07-30 DIAGNOSIS — R06.83 SNORING: Primary | ICD-10-CM

## 2024-07-30 NOTE — PROGRESS NOTES
Marilee Ewing Karen R, MA  Outside referral from TALIA Willett for untreated YUMIKO, snoring, waking choking, morning headaches. Referral scanned to chart. Not used CPAP since 2016.

## 2024-08-09 ENCOUNTER — HOSPITAL ENCOUNTER (OUTPATIENT)
Dept: GENERAL RADIOLOGY | Age: 59
Discharge: HOME | End: 2024-08-09
Payer: COMMERCIAL

## 2024-08-09 DIAGNOSIS — M54.50 LOW BACK PAIN, UNSPECIFIED BACK PAIN LATERALITY, UNSPECIFIED CHRONICITY, UNSPECIFIED WHETHER SCIATICA PRESENT: ICD-10-CM

## 2024-08-09 PROCEDURE — 72100 X-RAY EXAM L-S SPINE 2/3 VWS: CPT

## 2024-08-09 PROCEDURE — 72202 X-RAY EXAM SI JOINTS 3/> VWS: CPT

## 2024-08-14 ENCOUNTER — HOSPITAL ENCOUNTER (OUTPATIENT)
Dept: SLEEP MEDICINE | Age: 59
Discharge: HOME | End: 2024-08-17
Payer: COMMERCIAL

## 2024-08-15 PROCEDURE — 95811 POLYSOM 6/>YRS CPAP 4/> PARM: CPT

## 2024-08-16 ENCOUNTER — TELEPHONE (OUTPATIENT)
Dept: SLEEP MEDICINE | Age: 59
End: 2024-08-16

## 2024-08-19 ASSESSMENT — SLEEP AND FATIGUE QUESTIONNAIRES
HOW LIKELY ARE YOU TO NOD OFF OR FALL ASLEEP WHILE LYING DOWN TO REST IN THE AFTERNOON WHEN CIRCUMSTANCES PERMIT: SLIGHT CHANCE OF DOZING
HOW LIKELY ARE YOU TO NOD OFF OR FALL ASLEEP WHILE SITTING QUIETLY AFTER LUNCH WITHOUT ALCOHOL: SLIGHT CHANCE OF DOZING
HOW LIKELY ARE YOU TO NOD OFF OR FALL ASLEEP WHILE WATCHING TV: MODERATE CHANCE OF DOZING
HOW LIKELY ARE YOU TO NOD OFF OR FALL ASLEEP WHILE SITTING AND READING: SLIGHT CHANCE OF DOZING
HOW LIKELY ARE YOU TO NOD OFF OR FALL ASLEEP WHILE SITTING AND TALKING TO SOMEONE: WOULD NEVER DOZE
HOW LIKELY ARE YOU TO NOD OFF OR FALL ASLEEP WHEN YOU ARE A PASSENGER IN A CAR FOR AN HOUR WITHOUT A BREAK: SLIGHT CHANCE OF DOZING
HOW LIKELY ARE YOU TO NOD OFF OR FALL ASLEEP IN A CAR, WHILE STOPPED FOR A FEW MINUTES IN TRAFFIC: WOULD NEVER DOZE
ESS TOTAL SCORE: 6
HOW LIKELY ARE YOU TO NOD OFF OR FALL ASLEEP WHILE SITTING INACTIVE IN A PUBLIC PLACE: WOULD NEVER DOZE

## 2024-08-19 NOTE — PROGRESS NOTES
without erythema or exudate. Moist mucous membranes. Mallampati is 3, surgically absent tonsils. Tongue is normal. Patient has adequate dentition, moderate molar flattening normal palate.  Moderate retrognathia, thyroidectomy scar noted  Cardiovascular: nl s1/s2, RRR no m/r/g   Pulmonary: Respiratory effort is normal without labored breathing or accessory muscle use. No respiratory distress/cough/wheezng  Neurological: alert , Attention, speech and language are normal. There is facial symmetry.   Musculoskeletal:  Moves all extremities equally and no focal weakness noted. Gait is unassisted.  Ext: no c/c/e  Psychiatric: pleasant, mood and affect well adjusted.   cooperative and behavior is appropriate.        DIAGNOSTIC TESTS:  SPLIT NIGHT 8/14/24           ASSESSMENT AND PLAN  1. YUMIKO (obstructive sleep apnea)  Assessment & Plan:   Patient has a history of YUMIKO dating back into the early 2000's, did not do well with BiPAP and now once again has severe obstructive sleep apnea despite weight loss with bariatric surgery.  We discussed treatment options including bilevel therapy, inspire therapy and weight loss.  Oral appliance therapy briefly discussed although this is not first-line therapy for her.  She is very interested in inspire and I encouraged her to go ahead and try BiPAP as it has been over 15 years, we will order 21 over 17 cm H2O and will also refer her to [facility] for inspire evaluation.  Sleep study reviewed in detail and all questions answered, educational handout given on obstructive sleep apnea.  Orders:  -     DME - DURABLE MEDICAL EQUIPMENT  -     [facility]  2. Essential hypertension  Assessment & Plan:   Patient's blood pressure is not in control today.  Relationship with hypertension and untreated sleep apnea discussed.  Blood pressure may improve with successful treatment of obstructive sleep apnea.    3. BMI 30.0-30.9,adult  4. Chronic intractable headache, unspecified

## 2024-08-20 ENCOUNTER — OFFICE VISIT (OUTPATIENT)
Dept: SLEEP MEDICINE | Age: 59
End: 2024-08-20
Payer: COMMERCIAL

## 2024-08-20 VITALS
OXYGEN SATURATION: 97 % | TEMPERATURE: 97.9 F | BODY MASS INDEX: 30.19 KG/M2 | RESPIRATION RATE: 16 BRPM | DIASTOLIC BLOOD PRESSURE: 98 MMHG | HEART RATE: 98 BPM | SYSTOLIC BLOOD PRESSURE: 164 MMHG | HEIGHT: 65 IN | WEIGHT: 181.2 LBS

## 2024-08-20 DIAGNOSIS — R51.9 CHRONIC INTRACTABLE HEADACHE, UNSPECIFIED HEADACHE TYPE: ICD-10-CM

## 2024-08-20 DIAGNOSIS — I10 ESSENTIAL HYPERTENSION: ICD-10-CM

## 2024-08-20 DIAGNOSIS — G89.29 CHRONIC INTRACTABLE HEADACHE, UNSPECIFIED HEADACHE TYPE: ICD-10-CM

## 2024-08-20 DIAGNOSIS — G47.33 OSA (OBSTRUCTIVE SLEEP APNEA): Primary | ICD-10-CM

## 2024-08-20 PROCEDURE — 3080F DIAST BP >= 90 MM HG: CPT | Performed by: INTERNAL MEDICINE

## 2024-08-20 PROCEDURE — 3077F SYST BP >= 140 MM HG: CPT | Performed by: INTERNAL MEDICINE

## 2024-08-20 PROCEDURE — G2211 COMPLEX E/M VISIT ADD ON: HCPCS | Performed by: INTERNAL MEDICINE

## 2024-08-20 PROCEDURE — 99204 OFFICE O/P NEW MOD 45 MIN: CPT | Performed by: INTERNAL MEDICINE

## 2024-08-20 NOTE — ASSESSMENT & PLAN NOTE
Patient's blood pressure is not in control today.  Relationship with hypertension and untreated sleep apnea discussed.  Blood pressure may improve with successful treatment of obstructive sleep apnea.

## 2024-08-20 NOTE — ASSESSMENT & PLAN NOTE
While likely her headache is not entirely due to untreated obstructive sleep apnea this can certainly be a contributing factor as well as her uncontrolled high blood pressure which is also related to sleep disordered breathing.  Rationale for treatment

## 2024-08-20 NOTE — ASSESSMENT & PLAN NOTE
Patient has a history of YUMIKO dating back into the early 2000's, did not do well with BiPAP and now once again has severe obstructive sleep apnea despite weight loss with bariatric surgery.  We discussed treatment options including bilevel therapy, inspire therapy and weight loss.  Oral appliance therapy briefly discussed although this is not first-line therapy for her.  She is very interested in inspire and I encouraged her to go ahead and try BiPAP as it has been over 15 years, we will order 21 over 17 cm H2O and will also refer her to [facility] for inspire evaluation.  Sleep study reviewed in detail and all questions answered, educational handout given on obstructive sleep apnea.

## 2024-08-20 NOTE — PATIENT INSTRUCTIONS
It was a pleasure seeing you today.  Here are some items that we discussed:    1.   WE will order a bipap machine for you to get started on therapy, I have asked them to do a mask fitting.    2.   We will make a referral to ENT  today, please call us if you have not heard from them to get scheduled within a week.    3.  Please call us with any questions/concerns.    Lon Mccann MD  [phone]     Sleep Apnea: Care Instructions  Overview     Sleep apnea means that you frequently stop breathing for 10 seconds or longer during sleep. It can be mild to severe, based on the number of times an hour that you stop breathing.  Blocked or narrowed airways in your nose, mouth, or throat can cause sleep apnea. Your airway can become blocked when your throat muscles and tongue relax during sleep.  You can help treat sleep apnea at home by making lifestyle changes. You also can use a CPAP breathing machine that keeps tissues in the throat from blocking your airway. Or your doctor may suggest that you use a breathing device while you sleep. It helps keep your airway open. This could be a device that you put in your mouth. In some cases, surgery may be needed to remove enlarged tissues in the throat.  Follow-up care is a key part of your treatment and safety. Be sure to make and go to all appointments, and call your doctor if you are having problems. It's also a good idea to know your test results and keep a list of the medicines you take.  How can you care for yourself at home?  Lose weight, if needed.  Sleep on your side. It may help mild apnea.  Avoid alcohol and medicines such as sleeping pills, opioids, or sedatives before bed.  Don't smoke. If you need help quitting, talk to your doctor.  Prop up the head of your bed.  Treat breathing problems, such as a stuffy nose, that are caused by a cold or allergies.  Try a continuous positive airway pressure (CPAP) breathing machine if your doctor recommends it.  If CPAP doesn't

## 2024-09-05 ENCOUNTER — PREP FOR PROCEDURE (OUTPATIENT)
Dept: ENT CLINIC | Age: 59
End: 2024-09-05

## 2024-09-05 ENCOUNTER — OFFICE VISIT (OUTPATIENT)
Dept: ENT CLINIC | Age: 59
End: 2024-09-05
Payer: COMMERCIAL

## 2024-09-05 VITALS — WEIGHT: 180 LBS | HEIGHT: 65 IN | RESPIRATION RATE: 16 BRPM | BODY MASS INDEX: 29.99 KG/M2

## 2024-09-05 DIAGNOSIS — G47.33 OSA (OBSTRUCTIVE SLEEP APNEA): Primary | ICD-10-CM

## 2024-09-05 DIAGNOSIS — G47.33 OBSTRUCTIVE SLEEP APNEA (ADULT) (PEDIATRIC): Primary | ICD-10-CM

## 2024-09-05 PROCEDURE — 99244 OFF/OP CNSLTJ NEW/EST MOD 40: CPT | Performed by: STUDENT IN AN ORGANIZED HEALTH CARE EDUCATION/TRAINING PROGRAM

## 2024-09-05 RX ORDER — ACETAMINOPHEN 325 MG/1
650 TABLET ORAL EVERY 6 HOURS PRN
COMMUNITY
Start: 2022-04-13

## 2024-09-05 RX ORDER — RITUXIMAB-ABBS 10 MG/ML
INJECTION, SOLUTION INTRAVENOUS
COMMUNITY

## 2024-09-05 RX ORDER — HYDROXYZINE HYDROCHLORIDE 25 MG/1
TABLET, FILM COATED ORAL
COMMUNITY
Start: 2024-07-07

## 2024-09-05 RX ORDER — FEXOFENADINE HCL AND PSEUDOEPHEDRINE HCI 60; 120 MG/1; MG/1
1 TABLET, EXTENDED RELEASE ORAL
COMMUNITY
End: 2024-09-06

## 2024-09-05 RX ORDER — DULOXETIN HYDROCHLORIDE 20 MG/1
CAPSULE, DELAYED RELEASE ORAL DAILY
COMMUNITY
Start: 2024-09-04

## 2024-09-05 RX ORDER — ERGOCALCIFEROL 1.25 MG/1
50000 CAPSULE, LIQUID FILLED ORAL WEEKLY
COMMUNITY

## 2024-09-05 RX ORDER — OMEPRAZOLE 40 MG/1
CAPSULE, DELAYED RELEASE ORAL
COMMUNITY
Start: 2024-07-01

## 2024-09-05 RX ORDER — TOPIRAMATE 25 MG/1
1 TABLET, FILM COATED ORAL
COMMUNITY

## 2024-09-05 RX ORDER — CYCLOSPORINE 0.5 MG/ML
EMULSION OPHTHALMIC
COMMUNITY
Start: 2021-11-08

## 2024-09-05 RX ORDER — SUMATRIPTAN 50 MG/1
TABLET, FILM COATED ORAL
COMMUNITY
Start: 2024-07-07

## 2024-09-05 ASSESSMENT — ENCOUNTER SYMPTOMS
SINUS PRESSURE: 0
STRIDOR: 0
WHEEZING: 0
FACIAL SWELLING: 0
NAUSEA: 0
APNEA: 1
COUGH: 0
CHOKING: 0
SHORTNESS OF BREATH: 0
CONSTIPATION: 0
EYE PAIN: 0
SINUS PAIN: 0
EYE DISCHARGE: 0
DIARRHEA: 0
EYE ITCHING: 0

## 2024-09-05 NOTE — PROGRESS NOTES
[facility] Office Note    Patient: Lexi Cabrera  MRN: [id number]  : 1965  Gender:  female  Vital Signs: Resp 16   Ht 1.651 m (5' 5\")   Wt 81.6 kg (180 lb)   BMI 29.95 kg/m²   Date: 2024    CC:   Chief Complaint   Patient presents with    New Patient     Inspire consult       HPI:  Lexi Cabrera is a 58 y.o. female here for evaluation of YUMIKO.  Notes from referring provider reviewed.  PSG from 2024 showed an AHI of 51.8.  She has a BMI of 30.  She has tried BiPAP in the past but could not tolerate it secondary to claustrophobia, being unable to maintain a seal, it interfering with her sleep, and it often coming off in the middle of the night.  She has also tried an oral appliance but did not tolerate this secondary to sleep interference close TMJ.    Past Medical History, Past Surgical History, Family history, Social History, and Medications were all reviewed with the patient today and updated as necessary.     Allergies   Allergen Reactions    Ciprofloxacin Other (See Comments) and Rash     Possible leukocytoclastic vascultitis    Possible leukocytoclastic vascultitis   Other reaction(s): Other (comments)   Possible leukocytoclastic vascultitis    Other reaction(s): Other (comments)   Possible leukocytoclastic vascultitis    Codeine Nausea And Vomiting     Other reaction(s): Nausea and Vomiting    Can take Norco    Losartan Other (See Comments) and Rash     Vasculitus    Vasculitus   Other reaction(s): Other (comments)   Vasculitus    Other reaction(s): Other-A   Vasculitus    Possible Leukocytoclastic Vasculitus    Naproxen Sodium Other (See Comments)     Possible leukocytoclastic vascultitis    Possible leukocytoclastic vascultitis   Possible leukocytoclastic vascultitis    Nebivolol Other (See Comments) and Rash     Possible leukocytoclastic vascultitis    Possible leukocytoclastic vascultitis   Other reaction(s): Other (comments)   Possible leukocytoclastic vascultitis    Pravastatin Other

## 2024-09-18 RX ORDER — HALOPERIDOL 5 MG/ML
1 INJECTION INTRAMUSCULAR
Status: CANCELLED | OUTPATIENT
Start: 2024-09-18 | End: 2024-09-19

## 2024-09-18 RX ORDER — HYDROMORPHONE HYDROCHLORIDE 2 MG/ML
0.5 INJECTION, SOLUTION INTRAMUSCULAR; INTRAVENOUS; SUBCUTANEOUS EVERY 5 MIN PRN
Status: CANCELLED | OUTPATIENT
Start: 2024-09-18

## 2024-09-18 RX ORDER — IPRATROPIUM BROMIDE AND ALBUTEROL SULFATE 2.5; .5 MG/3ML; MG/3ML
1 SOLUTION RESPIRATORY (INHALATION)
Status: CANCELLED | OUTPATIENT
Start: 2024-09-18 | End: 2024-09-19

## 2024-09-18 RX ORDER — OXYCODONE HYDROCHLORIDE 5 MG/1
5 TABLET ORAL
Status: CANCELLED | OUTPATIENT
Start: 2024-09-18 | End: 2024-09-19

## 2024-09-18 RX ORDER — PROCHLORPERAZINE EDISYLATE 5 MG/ML
5 INJECTION INTRAMUSCULAR; INTRAVENOUS
Status: CANCELLED | OUTPATIENT
Start: 2024-09-18 | End: 2024-09-19

## 2024-09-18 RX ORDER — SODIUM CHLORIDE 9 MG/ML
INJECTION, SOLUTION INTRAVENOUS PRN
Status: CANCELLED | OUTPATIENT
Start: 2024-09-18

## 2024-09-18 RX ORDER — NALOXONE HYDROCHLORIDE 0.4 MG/ML
INJECTION, SOLUTION INTRAMUSCULAR; INTRAVENOUS; SUBCUTANEOUS PRN
Status: CANCELLED | OUTPATIENT
Start: 2024-09-18

## 2024-09-19 ENCOUNTER — ANESTHESIA EVENT (OUTPATIENT)
Dept: SURGERY | Age: 59
End: 2024-09-19
Payer: COMMERCIAL

## 2024-09-19 ENCOUNTER — ANESTHESIA (OUTPATIENT)
Dept: SURGERY | Age: 59
End: 2024-09-19
Payer: COMMERCIAL

## 2024-09-19 ENCOUNTER — HOSPITAL ENCOUNTER (OUTPATIENT)
Age: 59
Setting detail: OUTPATIENT SURGERY
Discharge: HOME | End: 2024-09-19
Attending: STUDENT IN AN ORGANIZED HEALTH CARE EDUCATION/TRAINING PROGRAM | Admitting: STUDENT IN AN ORGANIZED HEALTH CARE EDUCATION/TRAINING PROGRAM
Payer: COMMERCIAL

## 2024-09-19 VITALS
DIASTOLIC BLOOD PRESSURE: 79 MMHG | HEIGHT: 65 IN | WEIGHT: 180 LBS | HEART RATE: 76 BPM | BODY MASS INDEX: 29.99 KG/M2 | SYSTOLIC BLOOD PRESSURE: 161 MMHG | RESPIRATION RATE: 16 BRPM | TEMPERATURE: 97.8 F | OXYGEN SATURATION: 98 %

## 2024-09-19 DIAGNOSIS — G47.33 OBSTRUCTIVE SLEEP APNEA (ADULT) (PEDIATRIC): ICD-10-CM

## 2024-09-19 PROCEDURE — 7100000000 HC PACU RECOVERY - FIRST 15 MIN: Performed by: STUDENT IN AN ORGANIZED HEALTH CARE EDUCATION/TRAINING PROGRAM

## 2024-09-19 PROCEDURE — 7100000001 HC PACU RECOVERY - ADDTL 15 MIN: Performed by: STUDENT IN AN ORGANIZED HEALTH CARE EDUCATION/TRAINING PROGRAM

## 2024-09-19 PROCEDURE — 6370000000 HC RX 637 (ALT 250 FOR IP): Performed by: ANESTHESIOLOGY

## 2024-09-19 PROCEDURE — 2500000003 HC RX 250 WO HCPCS: Performed by: NURSE ANESTHETIST, CERTIFIED REGISTERED

## 2024-09-19 PROCEDURE — 7100000010 HC PHASE II RECOVERY - FIRST 15 MIN: Performed by: STUDENT IN AN ORGANIZED HEALTH CARE EDUCATION/TRAINING PROGRAM

## 2024-09-19 PROCEDURE — 3600000002 HC SURGERY LEVEL 2 BASE: Performed by: STUDENT IN AN ORGANIZED HEALTH CARE EDUCATION/TRAINING PROGRAM

## 2024-09-19 PROCEDURE — 2580000003 HC RX 258: Performed by: ANESTHESIOLOGY

## 2024-09-19 PROCEDURE — 42975 DISE EVAL SLP DO BRTH FLX DX: CPT | Performed by: STUDENT IN AN ORGANIZED HEALTH CARE EDUCATION/TRAINING PROGRAM

## 2024-09-19 PROCEDURE — 6360000002 HC RX W HCPCS: Performed by: NURSE ANESTHETIST, CERTIFIED REGISTERED

## 2024-09-19 PROCEDURE — 2709999900 HC NON-CHARGEABLE SUPPLY: Performed by: STUDENT IN AN ORGANIZED HEALTH CARE EDUCATION/TRAINING PROGRAM

## 2024-09-19 PROCEDURE — 6370000000 HC RX 637 (ALT 250 FOR IP): Performed by: STUDENT IN AN ORGANIZED HEALTH CARE EDUCATION/TRAINING PROGRAM

## 2024-09-19 PROCEDURE — 3700000000 HC ANESTHESIA ATTENDED CARE: Performed by: STUDENT IN AN ORGANIZED HEALTH CARE EDUCATION/TRAINING PROGRAM

## 2024-09-19 RX ORDER — LIDOCAINE HYDROCHLORIDE 20 MG/ML
INJECTION, SOLUTION EPIDURAL; INFILTRATION; INTRACAUDAL; PERINEURAL
Status: DISCONTINUED | OUTPATIENT
Start: 2024-09-19 | End: 2024-09-19 | Stop reason: SDUPTHER

## 2024-09-19 RX ORDER — SODIUM CHLORIDE, SODIUM LACTATE, POTASSIUM CHLORIDE, CALCIUM CHLORIDE 600; 310; 30; 20 MG/100ML; MG/100ML; MG/100ML; MG/100ML
INJECTION, SOLUTION INTRAVENOUS CONTINUOUS
Status: DISCONTINUED | OUTPATIENT
Start: 2024-09-19 | End: 2024-09-19 | Stop reason: HOSPADM

## 2024-09-19 RX ORDER — LIDOCAINE HYDROCHLORIDE 10 MG/ML
1 INJECTION, SOLUTION INFILTRATION; PERINEURAL
Status: DISCONTINUED | OUTPATIENT
Start: 2024-09-19 | End: 2024-09-19 | Stop reason: HOSPADM

## 2024-09-19 RX ORDER — SODIUM CHLORIDE 0.9 % (FLUSH) 0.9 %
5-40 SYRINGE (ML) INJECTION PRN
Status: DISCONTINUED | OUTPATIENT
Start: 2024-09-19 | End: 2024-09-19 | Stop reason: HOSPADM

## 2024-09-19 RX ORDER — SODIUM CHLORIDE 0.9 % (FLUSH) 0.9 %
5-40 SYRINGE (ML) INJECTION EVERY 12 HOURS SCHEDULED
Status: DISCONTINUED | OUTPATIENT
Start: 2024-09-19 | End: 2024-09-19 | Stop reason: HOSPADM

## 2024-09-19 RX ORDER — ACETAMINOPHEN 500 MG
1000 TABLET ORAL ONCE
Status: COMPLETED | OUTPATIENT
Start: 2024-09-19 | End: 2024-09-19

## 2024-09-19 RX ORDER — PROPOFOL 10 MG/ML
INJECTION, EMULSION INTRAVENOUS
Status: DISCONTINUED | OUTPATIENT
Start: 2024-09-19 | End: 2024-09-19 | Stop reason: SDUPTHER

## 2024-09-19 RX ORDER — MIDAZOLAM HYDROCHLORIDE 2 MG/2ML
2 INJECTION, SOLUTION INTRAMUSCULAR; INTRAVENOUS
Status: DISCONTINUED | OUTPATIENT
Start: 2024-09-19 | End: 2024-09-19 | Stop reason: HOSPADM

## 2024-09-19 RX ORDER — FENTANYL CITRATE 50 UG/ML
100 INJECTION, SOLUTION INTRAMUSCULAR; INTRAVENOUS
Status: DISCONTINUED | OUTPATIENT
Start: 2024-09-19 | End: 2024-09-19 | Stop reason: HOSPADM

## 2024-09-19 RX ORDER — OXYMETAZOLINE HYDROCHLORIDE 0.05 G/100ML
SPRAY NASAL PRN
Status: DISCONTINUED | OUTPATIENT
Start: 2024-09-19 | End: 2024-09-19 | Stop reason: ALTCHOICE

## 2024-09-19 RX ADMIN — LIDOCAINE HYDROCHLORIDE 50 MG: 20 INJECTION, SOLUTION EPIDURAL; INFILTRATION; INTRACAUDAL; PERINEURAL at 15:17

## 2024-09-19 RX ADMIN — PROPOFOL 50 MG: 10 INJECTION, EMULSION INTRAVENOUS at 15:17

## 2024-09-19 RX ADMIN — ACETAMINOPHEN 1000 MG: 500 TABLET, FILM COATED ORAL at 11:25

## 2024-09-19 RX ADMIN — SODIUM CHLORIDE, POTASSIUM CHLORIDE, SODIUM LACTATE AND CALCIUM CHLORIDE: 600; 310; 30; 20 INJECTION, SOLUTION INTRAVENOUS at 11:25

## 2024-09-30 NOTE — PROGRESS NOTES
Patient noted that it's time for his yearly enrollment in patient assistance.  Discussed with him and his wife that the easiest way to complete this is to fill out the paperwork by hand and we will fax it in.  She will drop it off for me and I will fill out the rest.    Radial compression band removed at 1200 after slowly reducing air from 12 cc to zero as per hospital protocol. No bleeding or hematoma noted. 2 x 2 gauze with tegaderm placed over puncture site. The affected extremity is warm and dry to the touch. Frequent vital signs documented per flowheet. Patient instructed to call if any bleeding noted on gauze. Patient verbalized understanding the nursing instructions.

## 2024-10-14 ENCOUNTER — TELEPHONE (OUTPATIENT)
Dept: ENT CLINIC | Age: 59
End: 2024-10-14

## 2024-10-14 NOTE — TELEPHONE ENCOUNTER
Lexi walked in to check on the status of her [facility] paperwork. She is stating that it is due on 10/16/24. When you get a chance will you call her to discuss at [phone].

## 2024-12-05 NOTE — PERIOP NOTE
Please drink 32 ounces of non-caffeinated clear liquids 2 hours prior to your arrival to avoid dehydration.        Patient verified name and .  Order for consent  was not found in EHR patient verifies procedure.   Type 1B surgery, Phone assessment complete.  Orders not received.  Labs per surgeon: none  Labs per anesthesia protocol: none    Patient answered medical/surgical history questions at their best of ability. All prior to admission medications documented in EPIC.    Patient instructed to continue taking all prescription medications up to the day of surgery but to take only the following medications the day of surgery according to anesthesia guidelines with a small sip of water: Tramadol (Ultram) if needed, Duloxetine (Cymbalta), fexofenadine (Allegra), Atorvastatin (Lipitor), hydroxychloroquine (Plaquenil), Imuran, Levothyroxine (Synthroid), Omeprazole (Prilosec) Also, patient is requested to take 2 Tylenol in the morning and then again before bed on the day before surgery. Regular or extra strength may be used.       Patient informed that all vitamins and supplements should be held 7 days prior to surgery and NSAIDS 5 days prior to surgery. Prescription meds to hold:none    Patient instructed on the following:    > Arrive at [facility]PC Entrance, time of arrival to be called the day before by 1700  > NPO after midnight, unless otherwise indicated, including gum, mints, and ice chips  > Responsible adult must drive patient to the hospital, stay during surgery, and patient will need supervision 24 hours after anesthesia  > Use non moisturizing soap in shower the night before surgery and on the morning of surgery  > All piercings must be removed prior to arrival.    > Leave all valuables (money and jewelry) at home but bring insurance card and ID on DOS.   > You may be required to pay a deductible or co-pay on the day of your procedure. You can pre-pay by calling [phone] if your surgery is at the [facility]

## 2024-12-06 ENCOUNTER — PREP FOR PROCEDURE (OUTPATIENT)
Dept: ENT CLINIC | Age: 59
End: 2024-12-06

## 2024-12-06 DIAGNOSIS — G47.33 OBSTRUCTIVE SLEEP APNEA (ADULT) (PEDIATRIC): Primary | ICD-10-CM

## 2024-12-13 ENCOUNTER — ANESTHESIA EVENT (OUTPATIENT)
Dept: SURGERY | Age: 59
End: 2024-12-13
Payer: COMMERCIAL

## 2024-12-17 NOTE — DISCHARGE INSTRUCTIONS
Incision Care        -You may shower, but do NOT submerge incision site under water.  Clean crusting from the incision with half hydrogen peroxide and half water.      -During surgery, sutures will be placed.  These will be removed at your post-operative appointment.    -Please apply an antibiotic ointment to this area three times daily for three days, then use Vaseline two times daily for two weeks.  After that you can use Mederma or other silicone-based scar-away cream, if desired.      -Please call the office if you notice and swelling, abnormal bleeding, discharge and/or high fevers.  (101 degrees or higher), (swelling below the incision only, is usually normal)    -Do not take Tylenol if using prescribed pain medicine, as it already has Tylenol in it.

## 2024-12-17 NOTE — PERIOP NOTE
Preop department called to notify patient of arrival time for scheduled procedure. Instructions given to   - Arrive at OPC Entrance [address].  - No solid food after midnight & Please drink 32 ounces of water 2 hours prior to your arrival to avoid dehydration unless otherwise indicated. No gum, mints, or ice chips.   - Have a responsible adult to drive patient to the hospital, stay during surgery, and patient will need supervision 24 hours after anesthesia.   - Use antibacterial soap in shower the night before surgery and on the morning of surgery.       Was patient contacted: yes-pt   Voicemail left:   Numbers contacted: [phone]   Arrival time: 0830    Time to complete 32 ounces of water: 0630

## 2024-12-18 ENCOUNTER — APPOINTMENT (OUTPATIENT)
Dept: GENERAL RADIOLOGY | Age: 59
End: 2024-12-18
Attending: STUDENT IN AN ORGANIZED HEALTH CARE EDUCATION/TRAINING PROGRAM
Payer: COMMERCIAL

## 2024-12-18 ENCOUNTER — ANESTHESIA (OUTPATIENT)
Dept: SURGERY | Age: 59
End: 2024-12-18
Payer: COMMERCIAL

## 2024-12-18 ENCOUNTER — HOSPITAL ENCOUNTER (OUTPATIENT)
Age: 59
Setting detail: OUTPATIENT SURGERY
Discharge: HOME | End: 2024-12-18
Attending: STUDENT IN AN ORGANIZED HEALTH CARE EDUCATION/TRAINING PROGRAM | Admitting: STUDENT IN AN ORGANIZED HEALTH CARE EDUCATION/TRAINING PROGRAM
Payer: COMMERCIAL

## 2024-12-18 VITALS
BODY MASS INDEX: 28.32 KG/M2 | WEIGHT: 170 LBS | HEART RATE: 74 BPM | SYSTOLIC BLOOD PRESSURE: 139 MMHG | RESPIRATION RATE: 15 BRPM | TEMPERATURE: 97.3 F | DIASTOLIC BLOOD PRESSURE: 70 MMHG | HEIGHT: 65 IN | OXYGEN SATURATION: 98 %

## 2024-12-18 DIAGNOSIS — G47.33 OBSTRUCTIVE SLEEP APNEA (ADULT) (PEDIATRIC): ICD-10-CM

## 2024-12-18 DIAGNOSIS — G89.18 POST-OP PAIN: Primary | ICD-10-CM

## 2024-12-18 PROCEDURE — 6360000002 HC RX W HCPCS: Performed by: ANESTHESIOLOGY

## 2024-12-18 PROCEDURE — 70360 X-RAY EXAM OF NECK: CPT

## 2024-12-18 PROCEDURE — 7100000000 HC PACU RECOVERY - FIRST 15 MIN: Performed by: STUDENT IN AN ORGANIZED HEALTH CARE EDUCATION/TRAINING PROGRAM

## 2024-12-18 PROCEDURE — 2580000003 HC RX 258: Performed by: NURSE ANESTHETIST, CERTIFIED REGISTERED

## 2024-12-18 PROCEDURE — 2709999900 HC NON-CHARGEABLE SUPPLY: Performed by: STUDENT IN AN ORGANIZED HEALTH CARE EDUCATION/TRAINING PROGRAM

## 2024-12-18 PROCEDURE — 3700000000 HC ANESTHESIA ATTENDED CARE: Performed by: STUDENT IN AN ORGANIZED HEALTH CARE EDUCATION/TRAINING PROGRAM

## 2024-12-18 PROCEDURE — 6360000002 HC RX W HCPCS: Performed by: NURSE ANESTHETIST, CERTIFIED REGISTERED

## 2024-12-18 PROCEDURE — 7100000011 HC PHASE II RECOVERY - ADDTL 15 MIN: Performed by: STUDENT IN AN ORGANIZED HEALTH CARE EDUCATION/TRAINING PROGRAM

## 2024-12-18 PROCEDURE — C1778 LEAD, NEUROSTIMULATOR: HCPCS | Performed by: STUDENT IN AN ORGANIZED HEALTH CARE EDUCATION/TRAINING PROGRAM

## 2024-12-18 PROCEDURE — 6360000002 HC RX W HCPCS: Performed by: STUDENT IN AN ORGANIZED HEALTH CARE EDUCATION/TRAINING PROGRAM

## 2024-12-18 PROCEDURE — C1894 INTRO/SHEATH, NON-LASER: HCPCS | Performed by: STUDENT IN AN ORGANIZED HEALTH CARE EDUCATION/TRAINING PROGRAM

## 2024-12-18 PROCEDURE — 2720000010 HC SURG SUPPLY STERILE: Performed by: STUDENT IN AN ORGANIZED HEALTH CARE EDUCATION/TRAINING PROGRAM

## 2024-12-18 PROCEDURE — 64582 OPN MPLTJ HPGLSL NSTM ARY PG: CPT | Performed by: STUDENT IN AN ORGANIZED HEALTH CARE EDUCATION/TRAINING PROGRAM

## 2024-12-18 PROCEDURE — C1767 GENERATOR, NEURO NON-RECHARG: HCPCS | Performed by: STUDENT IN AN ORGANIZED HEALTH CARE EDUCATION/TRAINING PROGRAM

## 2024-12-18 PROCEDURE — 3600000013 HC SURGERY LEVEL 3 ADDTL 15MIN: Performed by: STUDENT IN AN ORGANIZED HEALTH CARE EDUCATION/TRAINING PROGRAM

## 2024-12-18 PROCEDURE — 3700000001 HC ADD 15 MINUTES (ANESTHESIA): Performed by: STUDENT IN AN ORGANIZED HEALTH CARE EDUCATION/TRAINING PROGRAM

## 2024-12-18 PROCEDURE — 3600000003 HC SURGERY LEVEL 3 BASE: Performed by: STUDENT IN AN ORGANIZED HEALTH CARE EDUCATION/TRAINING PROGRAM

## 2024-12-18 PROCEDURE — 6370000000 HC RX 637 (ALT 250 FOR IP): Performed by: ANESTHESIOLOGY

## 2024-12-18 PROCEDURE — 7100000001 HC PACU RECOVERY - ADDTL 15 MIN: Performed by: STUDENT IN AN ORGANIZED HEALTH CARE EDUCATION/TRAINING PROGRAM

## 2024-12-18 PROCEDURE — 71045 X-RAY EXAM CHEST 1 VIEW: CPT

## 2024-12-18 PROCEDURE — 2500000003 HC RX 250 WO HCPCS: Performed by: STUDENT IN AN ORGANIZED HEALTH CARE EDUCATION/TRAINING PROGRAM

## 2024-12-18 PROCEDURE — 7100000010 HC PHASE II RECOVERY - FIRST 15 MIN: Performed by: STUDENT IN AN ORGANIZED HEALTH CARE EDUCATION/TRAINING PROGRAM

## 2024-12-18 PROCEDURE — 2500000003 HC RX 250 WO HCPCS: Performed by: NURSE ANESTHETIST, CERTIFIED REGISTERED

## 2024-12-18 PROCEDURE — 2580000003 HC RX 258: Performed by: ANESTHESIOLOGY

## 2024-12-18 DEVICE — THE INSPIRE® STIMULATION LEAD (MODEL 4063) IS DESIGNED TO DELIVER STIMULATION TO THE HYPOGLOSSAL NERVE FOR THE TREATMENT OF OBSTRUCTIVE SLEEP APNEA. THE LEAD FEATURES A FLEXIBLE, SELF-SIZING CUFF. ELECTRODES IN THE INNER SURFACE OF THE CUFF DELIVER STIMULATION TO THE NERVE. THE LEAD INCORPORATES A STANDARD CONNECTOR FOR COUPLING TO THE INSPIRE IMPLANTABLE PULSE GENERATOR (IPG).
Type: IMPLANTABLE DEVICE | Site: NECK | Status: FUNCTIONAL
Brand: INSPIRE

## 2024-12-18 DEVICE — GENERATOR PULSE IMPLANTABLE INSPIRE: Type: IMPLANTABLE DEVICE | Site: CHEST | Status: FUNCTIONAL

## 2024-12-18 DEVICE — THE INSPIRE® RESPIRATORY SENSING LEAD (MODEL 4340) IS DESIGNED TO DETECT RESPIRATORY EFFORT. THE LEAD FEATURES A PRESSURE SENSITIVE MEMBRANE THAT CONVERTS THE MECHANICAL ENERGY OF RESPIRATION INTO AN ELECTRICAL SIGNAL. THE LEAD INCORPORATES A STANDARD CONNECTOR FOR COUPLING TO THE INSPIRE IMPLANTABLE PULSE GENERATOR (IPG) FOR TREATMENT OF OBSTRUCTIVE SLEEP APNEA.
Type: IMPLANTABLE DEVICE | Site: NECK | Status: FUNCTIONAL
Brand: INSPIRE

## 2024-12-18 RX ORDER — OXYCODONE HYDROCHLORIDE 5 MG/1
5 TABLET ORAL PRN
Status: COMPLETED | OUTPATIENT
Start: 2024-12-18 | End: 2024-12-18

## 2024-12-18 RX ORDER — SODIUM CHLORIDE 0.9 % (FLUSH) 0.9 %
5-40 SYRINGE (ML) INJECTION EVERY 12 HOURS SCHEDULED
Status: DISCONTINUED | OUTPATIENT
Start: 2024-12-18 | End: 2024-12-18 | Stop reason: HOSPADM

## 2024-12-18 RX ORDER — FENTANYL CITRATE 50 UG/ML
INJECTION, SOLUTION INTRAMUSCULAR; INTRAVENOUS
Status: DISCONTINUED | OUTPATIENT
Start: 2024-12-18 | End: 2024-12-18 | Stop reason: SDUPTHER

## 2024-12-18 RX ORDER — LIDOCAINE HYDROCHLORIDE 20 MG/ML
INJECTION, SOLUTION EPIDURAL; INFILTRATION; INTRACAUDAL; PERINEURAL
Status: DISCONTINUED | OUTPATIENT
Start: 2024-12-18 | End: 2024-12-18 | Stop reason: SDUPTHER

## 2024-12-18 RX ORDER — LIDOCAINE HYDROCHLORIDE 10 MG/ML
1 INJECTION, SOLUTION INFILTRATION; PERINEURAL
Status: DISCONTINUED | OUTPATIENT
Start: 2024-12-18 | End: 2024-12-18 | Stop reason: HOSPADM

## 2024-12-18 RX ORDER — SODIUM CHLORIDE 9 MG/ML
INJECTION, SOLUTION INTRAVENOUS PRN
Status: DISCONTINUED | OUTPATIENT
Start: 2024-12-18 | End: 2024-12-18 | Stop reason: HOSPADM

## 2024-12-18 RX ORDER — DEXAMETHASONE SODIUM PHOSPHATE 10 MG/ML
INJECTION INTRAMUSCULAR; INTRAVENOUS
Status: DISCONTINUED | OUTPATIENT
Start: 2024-12-18 | End: 2024-12-18 | Stop reason: SDUPTHER

## 2024-12-18 RX ORDER — ROCURONIUM BROMIDE 10 MG/ML
INJECTION, SOLUTION INTRAVENOUS
Status: DISCONTINUED | OUTPATIENT
Start: 2024-12-18 | End: 2024-12-18 | Stop reason: SDUPTHER

## 2024-12-18 RX ORDER — ONDANSETRON 2 MG/ML
INJECTION INTRAMUSCULAR; INTRAVENOUS
Status: DISCONTINUED | OUTPATIENT
Start: 2024-12-18 | End: 2024-12-18 | Stop reason: SDUPTHER

## 2024-12-18 RX ORDER — NALOXONE HYDROCHLORIDE 0.4 MG/ML
INJECTION, SOLUTION INTRAMUSCULAR; INTRAVENOUS; SUBCUTANEOUS PRN
Status: DISCONTINUED | OUTPATIENT
Start: 2024-12-18 | End: 2024-12-18 | Stop reason: HOSPADM

## 2024-12-18 RX ORDER — ONDANSETRON 4 MG/1
4 TABLET, ORALLY DISINTEGRATING ORAL 3 TIMES DAILY PRN
Qty: 11 TABLET | Refills: 0 | Status: SHIPPED | OUTPATIENT
Start: 2024-12-18

## 2024-12-18 RX ORDER — ACETAMINOPHEN 500 MG
1000 TABLET ORAL ONCE
Status: COMPLETED | OUTPATIENT
Start: 2024-12-18 | End: 2024-12-18

## 2024-12-18 RX ORDER — DEXMEDETOMIDINE HYDROCHLORIDE 100 UG/ML
INJECTION, SOLUTION INTRAVENOUS
Status: DISCONTINUED | OUTPATIENT
Start: 2024-12-18 | End: 2024-12-18 | Stop reason: SDUPTHER

## 2024-12-18 RX ORDER — LIDOCAINE HYDROCHLORIDE AND EPINEPHRINE 10; 10 MG/ML; UG/ML
INJECTION, SOLUTION INFILTRATION; PERINEURAL PRN
Status: DISCONTINUED | OUTPATIENT
Start: 2024-12-18 | End: 2024-12-18 | Stop reason: ALTCHOICE

## 2024-12-18 RX ORDER — SUCCINYLCHOLINE/SOD CL,ISO/PF 200MG/10ML
SYRINGE (ML) INTRAVENOUS
Status: DISCONTINUED | OUTPATIENT
Start: 2024-12-18 | End: 2024-12-18 | Stop reason: SDUPTHER

## 2024-12-18 RX ORDER — SCOLOPAMINE TRANSDERMAL SYSTEM 1 MG/1
1 PATCH, EXTENDED RELEASE TRANSDERMAL
Status: DISCONTINUED | OUTPATIENT
Start: 2024-12-18 | End: 2024-12-18 | Stop reason: HOSPADM

## 2024-12-18 RX ORDER — OXYCODONE HYDROCHLORIDE 5 MG/1
10 TABLET ORAL PRN
Status: COMPLETED | OUTPATIENT
Start: 2024-12-18 | End: 2024-12-18

## 2024-12-18 RX ORDER — SODIUM CHLORIDE, SODIUM LACTATE, POTASSIUM CHLORIDE, CALCIUM CHLORIDE 600; 310; 30; 20 MG/100ML; MG/100ML; MG/100ML; MG/100ML
INJECTION, SOLUTION INTRAVENOUS CONTINUOUS
Status: DISCONTINUED | OUTPATIENT
Start: 2024-12-18 | End: 2024-12-18 | Stop reason: HOSPADM

## 2024-12-18 RX ORDER — HYDROMORPHONE HYDROCHLORIDE 2 MG/ML
0.5 INJECTION, SOLUTION INTRAMUSCULAR; INTRAVENOUS; SUBCUTANEOUS EVERY 5 MIN PRN
Status: DISCONTINUED | OUTPATIENT
Start: 2024-12-18 | End: 2024-12-18 | Stop reason: HOSPADM

## 2024-12-18 RX ORDER — CEPHALEXIN 500 MG/1
500 CAPSULE ORAL 2 TIMES DAILY
Qty: 14 CAPSULE | Refills: 0 | Status: SHIPPED | OUTPATIENT
Start: 2024-12-18 | End: 2024-12-25

## 2024-12-18 RX ORDER — ONDANSETRON 2 MG/ML
4 INJECTION INTRAMUSCULAR; INTRAVENOUS
Status: DISCONTINUED | OUTPATIENT
Start: 2024-12-18 | End: 2024-12-18 | Stop reason: HOSPADM

## 2024-12-18 RX ORDER — SODIUM CHLORIDE 0.9 % (FLUSH) 0.9 %
5-40 SYRINGE (ML) INJECTION PRN
Status: DISCONTINUED | OUTPATIENT
Start: 2024-12-18 | End: 2024-12-18 | Stop reason: HOSPADM

## 2024-12-18 RX ORDER — OXYCODONE AND ACETAMINOPHEN 5; 325 MG/1; MG/1
1 TABLET ORAL EVERY 6 HOURS PRN
Qty: 20 TABLET | Refills: 0 | Status: SHIPPED | OUTPATIENT
Start: 2024-12-18 | End: 2024-12-23

## 2024-12-18 RX ORDER — PROCHLORPERAZINE EDISYLATE 5 MG/ML
5 INJECTION INTRAMUSCULAR; INTRAVENOUS
Status: COMPLETED | OUTPATIENT
Start: 2024-12-18 | End: 2024-12-18

## 2024-12-18 RX ORDER — MIDAZOLAM HYDROCHLORIDE 2 MG/2ML
2 INJECTION, SOLUTION INTRAMUSCULAR; INTRAVENOUS
Status: DISCONTINUED | OUTPATIENT
Start: 2024-12-18 | End: 2024-12-18 | Stop reason: HOSPADM

## 2024-12-18 RX ORDER — PROPOFOL 10 MG/ML
INJECTION, EMULSION INTRAVENOUS
Status: DISCONTINUED | OUTPATIENT
Start: 2024-12-18 | End: 2024-12-18 | Stop reason: SDUPTHER

## 2024-12-18 RX ORDER — EPHEDRINE SULFATE 5 MG/ML
INJECTION INTRAVENOUS
Status: DISCONTINUED | OUTPATIENT
Start: 2024-12-18 | End: 2024-12-18 | Stop reason: SDUPTHER

## 2024-12-18 RX ORDER — MIDAZOLAM HYDROCHLORIDE 1 MG/ML
INJECTION, SOLUTION INTRAMUSCULAR; INTRAVENOUS
Status: DISCONTINUED | OUTPATIENT
Start: 2024-12-18 | End: 2024-12-18 | Stop reason: SDUPTHER

## 2024-12-18 RX ORDER — HYDROMORPHONE HYDROCHLORIDE 2 MG/ML
0.25 INJECTION, SOLUTION INTRAMUSCULAR; INTRAVENOUS; SUBCUTANEOUS EVERY 5 MIN PRN
Status: DISCONTINUED | OUTPATIENT
Start: 2024-12-18 | End: 2024-12-18 | Stop reason: HOSPADM

## 2024-12-18 RX ADMIN — CEFAZOLIN 2000 MG: 2 INJECTION, POWDER, FOR SOLUTION INTRAMUSCULAR; INTRAVENOUS at 10:47

## 2024-12-18 RX ADMIN — PHENYLEPHRINE HYDROCHLORIDE 100 MCG: 10 INJECTION INTRAVENOUS at 11:46

## 2024-12-18 RX ADMIN — ACETAMINOPHEN 1000 MG: 500 TABLET, FILM COATED ORAL at 08:56

## 2024-12-18 RX ADMIN — DEXAMETHASONE SODIUM PHOSPHATE 10 MG: 10 INJECTION INTRAMUSCULAR; INTRAVENOUS at 11:05

## 2024-12-18 RX ADMIN — ONDANSETRON 4 MG: 2 INJECTION INTRAMUSCULAR; INTRAVENOUS at 11:05

## 2024-12-18 RX ADMIN — ROCURONIUM BROMIDE 5 MG: 10 INJECTION, SOLUTION INTRAVENOUS at 10:42

## 2024-12-18 RX ADMIN — Medication 40 MG: at 10:50

## 2024-12-18 RX ADMIN — SODIUM CHLORIDE, SODIUM LACTATE, POTASSIUM CHLORIDE, AND CALCIUM CHLORIDE: 600; 310; 30; 20 INJECTION, SOLUTION INTRAVENOUS at 10:32

## 2024-12-18 RX ADMIN — FENTANYL CITRATE 25 MCG: 50 INJECTION, SOLUTION INTRAMUSCULAR; INTRAVENOUS at 11:53

## 2024-12-18 RX ADMIN — PROCHLORPERAZINE EDISYLATE 5 MG: 5 INJECTION INTRAMUSCULAR; INTRAVENOUS at 13:37

## 2024-12-18 RX ADMIN — EPHEDRINE SULFATE 10 MG: 5 INJECTION INTRAVENOUS at 12:13

## 2024-12-18 RX ADMIN — PHENYLEPHRINE HYDROCHLORIDE 150 MCG: 10 INJECTION INTRAVENOUS at 11:13

## 2024-12-18 RX ADMIN — FENTANYL CITRATE 25 MCG: 50 INJECTION, SOLUTION INTRAMUSCULAR; INTRAVENOUS at 11:09

## 2024-12-18 RX ADMIN — PHENYLEPHRINE HYDROCHLORIDE 100 MCG: 10 INJECTION INTRAVENOUS at 11:27

## 2024-12-18 RX ADMIN — OXYCODONE 5 MG: 5 TABLET ORAL at 13:26

## 2024-12-18 RX ADMIN — LIDOCAINE HYDROCHLORIDE 100 MG: 20 INJECTION, SOLUTION EPIDURAL; INFILTRATION; INTRACAUDAL; PERINEURAL at 10:42

## 2024-12-18 RX ADMIN — FENTANYL CITRATE 50 MCG: 50 INJECTION, SOLUTION INTRAMUSCULAR; INTRAVENOUS at 10:41

## 2024-12-18 RX ADMIN — Medication 160 MG: at 10:42

## 2024-12-18 RX ADMIN — MIDAZOLAM 2 MG: 1 INJECTION INTRAMUSCULAR; INTRAVENOUS at 10:38

## 2024-12-18 RX ADMIN — PROPOFOL 50 MG: 10 INJECTION, EMULSION INTRAVENOUS at 10:50

## 2024-12-18 RX ADMIN — EPHEDRINE SULFATE 5 MG: 5 INJECTION INTRAVENOUS at 11:28

## 2024-12-18 RX ADMIN — DEXMEDETOMIDINE 8 MCG: 100 INJECTION, SOLUTION, CONCENTRATE INTRAVENOUS at 11:04

## 2024-12-18 RX ADMIN — PROPOFOL 150 MG: 10 INJECTION, EMULSION INTRAVENOUS at 10:42

## 2024-12-18 RX ADMIN — PROPOFOL 40 MG: 10 INJECTION, EMULSION INTRAVENOUS at 11:53

## 2024-12-18 RX ADMIN — EPHEDRINE SULFATE 10 MG: 5 INJECTION INTRAVENOUS at 12:21

## 2024-12-18 RX ADMIN — PHENYLEPHRINE HYDROCHLORIDE 150 MCG: 10 INJECTION INTRAVENOUS at 11:37

## 2024-12-18 ASSESSMENT — PAIN SCALES - GENERAL: PAINLEVEL_OUTOF10: 0

## 2024-12-18 NOTE — ANESTHESIA PRE PROCEDURE
Department of Anesthesiology  Preprocedure Note       Name:  Lexi Cabrera   Age:  59 y.o.  :  1965                                          MRN:  [id number]         Date:  2024      Surgeon: Surgeon(s):  Sukhdev Ervin MD    Procedure: Procedure(s):  STIMULATOR INSERTION INPSIRE    Medications prior to admission:   Prior to Admission medications    Medication Sig Start Date End Date Taking? Authorizing Provider   Multiple Vitamin (MVI, BARIATRIC ADVANTAGE MULTI-FORMULA, CHEW TAB) Take 1 tablet by mouth daily   Yes ProviderYi MD   acetaminophen (TYLENOL) 325 MG tablet Take 2 tablets by mouth every 6 hours as needed 22  Yes ProviderYi MD   vitamin D (ERGOCALCIFEROL) 1.25 MG (93074 UT) CAPS capsule Take 1 capsule by mouth Once a week at 5 PM   Yes ProviderYi MD   riTUXimab-abbs (TRUXIMA) 500 MG/50ML injection as directed Intravenous day 1 and day 15 every 4 months   Yes ProviderYi MD   DULoxetine (CYMBALTA) 20 MG extended release capsule daily 24  Yes Provider, MD Yi   omeprazole (PRILOSEC) 40 MG delayed release capsule TAKE 1 CAPSULE BY MOUTH EVERY DAY FOR 90 DAYS 24  Yes ProviderYi MD   hydroxychloroquine (PLAQUENIL) 200 MG tablet Take 1 tablet by mouth 2 times daily   Yes ProviderYi MD   azaTHIOprine (IMURAN) 50 MG tablet 2 times daily 22  Yes Yi Delong MD   traMADol (ULTRAM) 50 MG tablet Take 1 tablet by mouth every 6 hours as needed. 22  Yes Yi Delong MD   atorvastatin (LIPITOR) 40 MG tablet Take 1 tablet by mouth every morning 1/3/18  Yes Automatic Reconciliation, Ar   fexofenadine (ALLEGRA) 180 MG tablet Take 1 tablet by mouth daily   Yes Automatic Reconciliation, Ar   levothyroxine (SYNTHROID) 150 MCG tablet Take 1 tablet by mouth Daily   Yes Automatic Reconciliation, Ar   montelukast (SINGULAIR) 10 MG tablet Take 1 tablet by mouth daily 17  Yes Automatic

## 2024-12-18 NOTE — ANESTHESIA POSTPROCEDURE EVALUATION
Department of Anesthesiology  Postprocedure Note    Patient: Lexi Cabrera  MRN: [id number]  YOB: 1965  Date of evaluation: 12/18/2024    Procedure Summary       Date: 12/18/24 Room / Location: [facility] OP OR 04 / [facility]    Anesthesia Start: 1032 Anesthesia Stop: 1253    Procedure: STIMULATOR INSERTION INPSIRE (Right: Neck) Diagnosis:       YUMIKO (obstructive sleep apnea)      (YUMIKO (obstructive sleep apnea) [G47.33])    Surgeons: Sukhdev Ervin MD Responsible Provider: LAVERN Patel MD    Anesthesia Type: General ASA Status: 2            Anesthesia Type: General    Shara Phase I: Shara Score: 8    Shara Phase II:      Anesthesia Post Evaluation    Patient location during evaluation: PACU  Patient participation: complete - patient participated  Level of consciousness: awake and alert  Airway patency: patent  Nausea & Vomiting: no nausea and no vomiting  Cardiovascular status: hemodynamically stable  Respiratory status: acceptable  Hydration status: euvolemic  Comments: Blood pressure 139/65, pulse 85, temperature 97.3 °F (36.3 °C), temperature source Temporal, resp. rate 16, height 1.651 m (5' 5\"), weight 77.1 kg (170 lb), SpO2 100%.    No apparent anesthetic complications.  Pt stable for discharge from PACU  Multimodal analgesia pain management approach  Pain management: adequate    No notable events documented.

## 2024-12-18 NOTE — OP NOTE
Operative Note      Patient: Lexi Cabrera  YOB: 1965  MRN: [id number]    Date of Procedure: 12/18/2024    Pre-Op Diagnosis Codes:      * YUMIKO (obstructive sleep apnea) [G47.33]    Post-Op Diagnosis: Same       Procedure(s):  STIMULATOR INSERTION INPSIRE    Surgeon(s):  Sukhdev Ervin MD    Assistant:   * No surgical staff found *    Anesthesia: General    Estimated Blood Loss (mL): less than 50     Complications: None    Specimens:   * No specimens in log *    Implants:  Implant Name Type Inv. Item Serial No.  Lot No. LRB No. Used Action   LEAD STIMULATION UPPER AIRWAY INSPIRE - [id number]  LEAD STIMULATION UPPER AIRWAY INSPIRE [id number] [employer]  Right 1 Implanted   LEAD SENSING RESPIRATORY INSPIRE - [id number] Implantable long term continuous electrocardiography EKG system LEAD SENSING RESPIRATORY INSPIRE [id number] [employer]  Right 1 Implanted   GENERATOR PULSE IMPLANTABLE INSPIRE - [id number]  GENERATOR PULSE IMPLANTABLE INSPIRE [id number] [employer]  Right 1 Implanted         Drains: * No LDAs found *    Findings: Good tongue protrusion at 1.5 mV.  BMI of 28     Detailed Description of Procedure: The patient was brought to the Operating Room and was anesthetized via general endotracheal anesthesia without complication. A  shoulder roll was placed and the patient was prepped and draped in usual  sterile fashion with the head turned to the left. Prior to prepping and draping,  electrodes were placed in the genioglossus and hyoglossus muscle and  connected to the NIM box for intraoperative nerve monitoring.  A modified sub-mandibular incision was made in the right upper neck  approximately 2 cm below the mandible. Dissection was carried down through  the subcutaneous tissue and platysma. The anterior/inferior border of the  submandibular gland was identified as well as the digastric tendon. The  submandibular gland and the overlying fascia

## 2024-12-18 NOTE — H&P
severe with Gastric sleeve conversion surgery 2021- none prior    Sleep apnea       off CPAP since gastric bypass/ has not been re-evaluated    Thyroid disease       hyperthyroidism-   thyroid removed, levothyroxine daily         Social History              Tobacco Use    Smoking status: Never    Smokeless tobacco: Never   Substance Use Topics    Alcohol use: No      Past Surgical History             Past Surgical History:   Procedure Laterality Date    BREAST BIOPSY Bilateral       benign    BREAST BIOPSY Left 2023     Stereotactic Biopsy     SECTION          x 2    CHOLECYSTECTOMY        GASTRECTOMY   2016     Sleeve    GASTRIC BYPASS SURGERY   2021    HYSTERECTOMY (CERVIX STATUS UNKNOWN)       NEEL STEROTACTIC LOC BREAST BIOPSY LEFT Left 2023     NEEL STEROTACTIC LOC BREAST BIOPSY LEFT 2023 Darrell Meade MD [facility]    OVARY REMOVAL        unilateral    SINUS SURGERY PROC UNLISTED       SINUS SURGERY PROC UNLISTED   early     THYROIDECTOMY   2008    TONSILLECTOMY       US BREAST BIOPSY W LOC DEVICE 1ST LESION RIGHT Right 10/31/2022     US BREAST NEEDLE BIOPSY RIGHT 10/31/2022 [facility]         Family History             Family History   Adopted: Yes   Problem Relation Age of Onset    Breast Cancer Maternal Aunt 68            ROS:     Review of Systems   Constitutional:  Negative for chills and fever.   HENT:  Negative for congestion, facial swelling, hearing loss, nosebleeds, sinus pressure, sinus pain and sneezing.    Eyes:  Negative for pain, discharge and itching.   Respiratory:  Positive for apnea. Negative for cough, choking, shortness of breath, wheezing and stridor.    Cardiovascular:  Negative for chest pain.   Gastrointestinal:  Negative for constipation, diarrhea and nausea.   Endocrine: Negative for polyuria.   Genitourinary:  Negative for difficulty urinating and flank pain.   Musculoskeletal:  Negative for

## 2024-12-24 ENCOUNTER — OFFICE VISIT (OUTPATIENT)
Dept: ENT CLINIC | Age: 59
End: 2024-12-24

## 2024-12-24 VITALS — BODY MASS INDEX: 28.82 KG/M2 | WEIGHT: 173 LBS | HEIGHT: 65 IN

## 2024-12-24 DIAGNOSIS — G47.33 OSA (OBSTRUCTIVE SLEEP APNEA): Primary | ICD-10-CM

## 2024-12-24 PROCEDURE — 99024 POSTOP FOLLOW-UP VISIT: CPT | Performed by: STUDENT IN AN ORGANIZED HEALTH CARE EDUCATION/TRAINING PROGRAM

## 2024-12-24 ASSESSMENT — ENCOUNTER SYMPTOMS
CONSTIPATION: 0
COUGH: 0
NAUSEA: 0
EYE ITCHING: 0
APNEA: 0
EYE DISCHARGE: 0
DIARRHEA: 0
EYE PAIN: 0
SINUS PAIN: 0
CHOKING: 0
STRIDOR: 0
FACIAL SWELLING: 0
SINUS PRESSURE: 0
SHORTNESS OF BREATH: 0
WHEEZING: 0

## 2024-12-24 NOTE — PROGRESS NOTES
[facility] Office Note    Patient: Lexi Cabrera  MRN: [id number]  : 1965  Gender:  female  Vital Signs: Ht 1.651 m (5' 5\")   Wt 78.5 kg (173 lb)   BMI 28.79 kg/m²   Date: 2024    CC:   Chief Complaint   Patient presents with    Post-Op Check     POST OP -  INSIPRE.       HPI:  Lexi Cabrera is a 59 y.o. female status post inspire implantation on 2024.  She is doing well postoperatively.    Past Medical History, Past Surgical History, Family history, Social History, and Medications were all reviewed with the patient today and updated as necessary.     Allergies   Allergen Reactions    Ciprofloxacin Other (See Comments) and Rash     Possible leukocytoclastic vascultitis    Possible leukocytoclastic vascultitis   Other reaction(s): Other (comments)   Possible leukocytoclastic vascultitis    Other reaction(s): Other (comments)   Possible leukocytoclastic vascultitis    Codeine Nausea And Vomiting     Other reaction(s): Nausea and Vomiting    Can take Norco    Losartan Other (See Comments) and Rash     Vasculitus    Vasculitus   Other reaction(s): Other (comments)   Vasculitus    Other reaction(s): Other-A   Vasculitus    Possible Leukocytoclastic Vasculitus    Naproxen Sodium Other (See Comments)     Possible leukocytoclastic vascultitis    Possible leukocytoclastic vascultitis   Possible leukocytoclastic vascultitis    Nebivolol Other (See Comments) and Rash     Possible leukocytoclastic vascultitis    Possible leukocytoclastic vascultitis   Other reaction(s): Other (comments)   Possible leukocytoclastic vascultitis    Pravastatin Other (See Comments) and Rash     Possible leukocytoclastic vascultitis    Possible leukocytoclastic vascultitis   Possible leukocytoclastic vascultitis    Lisinopril Other (See Comments)     Possible leukocytoclastic vascultitis    Nebivolol Hcl Other (See Comments)    Nsaids Other (See Comments)     Other Reaction(s): leukocytadastic vasculitus    Regular use

## 2024-12-26 ENCOUNTER — TELEPHONE (OUTPATIENT)
Dept: ENT CLINIC | Age: 59
End: 2024-12-26

## 2025-01-16 NOTE — PROGRESS NOTES
[facility]  [address]  [place] [address]  [phone]    Patient Name:  Lexi Cabrera  YOB: 1965      Office Visit 1/17/2025    CHIEF COMPLAINT:      Chief Complaint   Patient presents with    Sleep Apnea         HISTORY OF PRESENT ILLNESS: This patient is seen today for follow-up of sleep apnea and activation of inspire device    To recap:    Ms. Cabrera is a 59 y.o.  female referred for sleep disordered breathing by TALIA Murdock but identifies Danisha Krishnamurthy as her primary care physician.  The patient has a medical history significant for hypertension/hyperlipidemia, migraine headaches, depression, rheumatoid arthritis, allergic rhinitis, peptic ulcer disease as complication of bariatric surgery had to have a revision, bruxism, obesity with a BMI of 30.  She underwent sleep testing and would like to review the results.  She has a several year history of snoring and nonrestorative sleep and lately has been struggling with elevated blood pressure as well as intractable headaches, she was actually diagnosed with obstructive sleep apnea when she was 30 to 50 pounds heavier back in the early 2000's and was on BiPAP at that time, she lost weight after her bariatric surgery and simply discontinued bilevel therapy as she was very frustrated with it and thought she was getting better.  She notes she is forgetful at times does have nocturia voiding 2-3 times at night, wakes up with a dry mouth.  She denies any abnormal movements in sleep other than being very restless with frequent body position changes, she sleeps with her  and is noted by both him and her family to have irregular breathing pausing at times.  She denies any insomnia but does use her phone right before going to bed but generally will fall asleep within 30 minutes and has only brief awakenings in the middle of the night.  No restless leg symptomatology, no parasomnias.  She does have a bite guard that she

## 2025-01-17 ENCOUNTER — OFFICE VISIT (OUTPATIENT)
Dept: SLEEP MEDICINE | Age: 60
End: 2025-01-17

## 2025-01-17 VITALS
SYSTOLIC BLOOD PRESSURE: 124 MMHG | HEIGHT: 65 IN | HEART RATE: 78 BPM | TEMPERATURE: 97.5 F | RESPIRATION RATE: 18 BRPM | OXYGEN SATURATION: 98 % | DIASTOLIC BLOOD PRESSURE: 64 MMHG | WEIGHT: 171 LBS | BODY MASS INDEX: 28.49 KG/M2

## 2025-01-17 DIAGNOSIS — G47.34 NOCTURNAL HYPOXEMIA: ICD-10-CM

## 2025-01-17 DIAGNOSIS — G47.33 OSA (OBSTRUCTIVE SLEEP APNEA): Primary | ICD-10-CM

## 2025-01-17 RX ORDER — PREDNISONE 5 MG/1
5 TABLET ORAL DAILY
COMMUNITY
Start: 2025-01-16

## 2025-01-17 ASSESSMENT — SLEEP AND FATIGUE QUESTIONNAIRES
HOW LIKELY ARE YOU TO NOD OFF OR FALL ASLEEP IN A CAR, WHILE STOPPED FOR A FEW MINUTES IN TRAFFIC: WOULD NEVER DOZE
HOW LIKELY ARE YOU TO NOD OFF OR FALL ASLEEP WHEN YOU ARE A PASSENGER IN A CAR FOR AN HOUR WITHOUT A BREAK: SLIGHT CHANCE OF DOZING
HOW LIKELY ARE YOU TO NOD OFF OR FALL ASLEEP WHILE SITTING AND READING: WOULD NEVER DOZE
HOW LIKELY ARE YOU TO NOD OFF OR FALL ASLEEP WHILE LYING DOWN TO REST IN THE AFTERNOON WHEN CIRCUMSTANCES PERMIT: SLIGHT CHANCE OF DOZING
ESS TOTAL SCORE: 3
HOW LIKELY ARE YOU TO NOD OFF OR FALL ASLEEP WHILE SITTING AND TALKING TO SOMEONE: WOULD NEVER DOZE
HOW LIKELY ARE YOU TO NOD OFF OR FALL ASLEEP WHILE SITTING INACTIVE IN A PUBLIC PLACE: WOULD NEVER DOZE
HOW LIKELY ARE YOU TO NOD OFF OR FALL ASLEEP WHILE WATCHING TV: SLIGHT CHANCE OF DOZING
HOW LIKELY ARE YOU TO NOD OFF OR FALL ASLEEP WHILE SITTING QUIETLY AFTER LUNCH WITHOUT ALCOHOL: WOULD NEVER DOZE

## 2025-02-20 ENCOUNTER — OFFICE VISIT (OUTPATIENT)
Dept: SLEEP MEDICINE | Age: 60
End: 2025-02-20
Payer: COMMERCIAL

## 2025-02-20 VITALS
OXYGEN SATURATION: 100 % | SYSTOLIC BLOOD PRESSURE: 134 MMHG | DIASTOLIC BLOOD PRESSURE: 84 MMHG | HEIGHT: 65 IN | HEART RATE: 80 BPM | WEIGHT: 174 LBS | BODY MASS INDEX: 28.99 KG/M2 | RESPIRATION RATE: 14 BRPM

## 2025-02-20 DIAGNOSIS — G47.33 OSA (OBSTRUCTIVE SLEEP APNEA): Primary | ICD-10-CM

## 2025-02-20 DIAGNOSIS — G47.34 NOCTURNAL HYPOXEMIA: ICD-10-CM

## 2025-02-20 PROCEDURE — 99215 OFFICE O/P EST HI 40 MIN: CPT | Performed by: INTERNAL MEDICINE

## 2025-02-20 PROCEDURE — 3075F SYST BP GE 130 - 139MM HG: CPT | Performed by: INTERNAL MEDICINE

## 2025-02-20 PROCEDURE — 3079F DIAST BP 80-89 MM HG: CPT | Performed by: INTERNAL MEDICINE

## 2025-02-20 PROCEDURE — 95977 ALYS CPLX CN NPGT PRGRMG: CPT | Performed by: INTERNAL MEDICINE

## 2025-02-20 ASSESSMENT — SLEEP AND FATIGUE QUESTIONNAIRES
HOW LIKELY ARE YOU TO NOD OFF OR FALL ASLEEP WHILE WATCHING TV: SLIGHT CHANCE OF DOZING
ESS TOTAL SCORE: 4
HOW LIKELY ARE YOU TO NOD OFF OR FALL ASLEEP WHILE LYING DOWN TO REST IN THE AFTERNOON WHEN CIRCUMSTANCES PERMIT: SLIGHT CHANCE OF DOZING
HOW LIKELY ARE YOU TO NOD OFF OR FALL ASLEEP WHEN YOU ARE A PASSENGER IN A CAR FOR AN HOUR WITHOUT A BREAK: MODERATE CHANCE OF DOZING
HOW LIKELY ARE YOU TO NOD OFF OR FALL ASLEEP WHILE SITTING AND READING: WOULD NEVER DOZE
HOW LIKELY ARE YOU TO NOD OFF OR FALL ASLEEP WHILE SITTING AND TALKING TO SOMEONE: WOULD NEVER DOZE
HOW LIKELY ARE YOU TO NOD OFF OR FALL ASLEEP WHILE SITTING QUIETLY AFTER LUNCH WITHOUT ALCOHOL: WOULD NEVER DOZE
HOW LIKELY ARE YOU TO NOD OFF OR FALL ASLEEP IN A CAR, WHILE STOPPED FOR A FEW MINUTES IN TRAFFIC: WOULD NEVER DOZE
HOW LIKELY ARE YOU TO NOD OFF OR FALL ASLEEP WHILE SITTING INACTIVE IN A PUBLIC PLACE: WOULD NEVER DOZE

## 2025-03-25 ENCOUNTER — OFFICE VISIT (OUTPATIENT)
Dept: ENT CLINIC | Age: 60
End: 2025-03-25
Payer: COMMERCIAL

## 2025-03-25 VITALS
DIASTOLIC BLOOD PRESSURE: 96 MMHG | HEIGHT: 65 IN | SYSTOLIC BLOOD PRESSURE: 144 MMHG | BODY MASS INDEX: 28.49 KG/M2 | WEIGHT: 171 LBS

## 2025-03-25 DIAGNOSIS — L91.0 HYPERTROPHIC SCAR: ICD-10-CM

## 2025-03-25 DIAGNOSIS — G47.33 OSA (OBSTRUCTIVE SLEEP APNEA): Primary | ICD-10-CM

## 2025-03-25 PROCEDURE — 11900 INJECT SKIN LESIONS </W 7: CPT | Performed by: STUDENT IN AN ORGANIZED HEALTH CARE EDUCATION/TRAINING PROGRAM

## 2025-03-25 PROCEDURE — 3077F SYST BP >= 140 MM HG: CPT | Performed by: STUDENT IN AN ORGANIZED HEALTH CARE EDUCATION/TRAINING PROGRAM

## 2025-03-25 PROCEDURE — 99214 OFFICE O/P EST MOD 30 MIN: CPT | Performed by: STUDENT IN AN ORGANIZED HEALTH CARE EDUCATION/TRAINING PROGRAM

## 2025-03-25 PROCEDURE — 3080F DIAST BP >= 90 MM HG: CPT | Performed by: STUDENT IN AN ORGANIZED HEALTH CARE EDUCATION/TRAINING PROGRAM

## 2025-03-25 RX ORDER — LEVOTHYROXINE SODIUM 175 UG/1
175 TABLET ORAL DAILY
COMMUNITY
Start: 2025-03-18

## 2025-03-25 RX ORDER — TRIAMCINOLONE ACETONIDE 40 MG/ML
40 INJECTION, SUSPENSION INTRA-ARTICULAR; INTRAMUSCULAR ONCE
Status: COMPLETED | OUTPATIENT
Start: 2025-03-25 | End: 2025-03-25

## 2025-03-25 RX ADMIN — TRIAMCINOLONE ACETONIDE 40 MG: 40 INJECTION, SUSPENSION INTRA-ARTICULAR; INTRAMUSCULAR at 09:45

## 2025-03-25 ASSESSMENT — ENCOUNTER SYMPTOMS
ABDOMINAL PAIN: 0
SINUS PAIN: 0
COLOR CHANGE: 0
EYE REDNESS: 0
STRIDOR: 0
CHOKING: 0
ABDOMINAL DISTENTION: 0
COUGH: 0
EYE PAIN: 0
CONSTIPATION: 0
EYE DISCHARGE: 0

## 2025-03-25 NOTE — PROGRESS NOTES
[facility] Office Note    Patient: eLxi Cabrera  MRN: [id number]  : 1965  Gender:  female  Vital Signs: BP (!) 144/96 (BP Site: Right Upper Arm, Patient Position: Sitting)   Ht 1.651 m (5' 5\")   Wt 77.6 kg (171 lb)   BMI 28.46 kg/m²   Date: 3/25/2025    CC:   Chief Complaint   Patient presents with    Follow-up     YUMIKO/inspire       HPI:  Lexi Cabrera is a 59 y.o. female  status post inspire implantation on 2024. The patient indicated excellent compliance with her inspire device with the average 90% of using the device and 87% usage more than 4 hours nightly. Her average daily use is 8 hours and 6 minutes. She feels much better and more rested and her  indicated to her she is no longer snoring. She indicated that she sleep on her right side and has some issue with the TMJ and her right jaw previously and she has noted that when the device is on she wake up sometimes because of that. We suggested that she change her position potentially sleep on the left side to see if that will help. Several adjustments made in her setting to make her stimulation much smoother and she seemed to tolerate that much better. The new stimulation setting is noted below. Overall, she is doing quite well with using inspire device. The [place] score today is 4/24.  She is followed by Dr. Stanton.  She endorses mild soreness to her chest incision site.  She is overall very happy with the inspire.    Past Medical History, Past Surgical History, Family history, Social History, and Medications were all reviewed with the patient today and updated as necessary.     Allergies   Allergen Reactions    Ciprofloxacin Other (See Comments) and Rash     Possible leukocytoclastic vascultitis    Possible leukocytoclastic vascultitis   Other reaction(s): Other (comments)   Possible leukocytoclastic vascultitis    Other reaction(s): Other (comments)   Possible leukocytoclastic vascultitis    Codeine Nausea And Vomiting     Other

## 2025-03-25 NOTE — PROGRESS NOTES
[facility]  [address]  [place] [address]  [phone]    Patient Name:  Lexi Cabrera    YOB: 1965      Office Visit 3/27/2025    CHIEF COMPLAINT:      Chief Complaint   Patient presents with    Sleep Apnea       HISTORY OF PRESENT ILLNESS: This patient is seen today for follow-up of sleep apnea using inspire device    To recap:    Ms. Cabrera is a 59 y.o.  female referred for sleep disordered breathing by TALIA Murdock but identifies Danisha Krishnamurthy as her primary care physician.  The patient has a medical history significant for hypertension/hyperlipidemia, migraine headaches, depression, rheumatoid arthritis, allergic rhinitis, peptic ulcer disease as complication of bariatric surgery had to have a revision, bruxism, obesity with a BMI of 30.  She underwent sleep testing and would like to review the results.  She has a several year history of snoring and nonrestorative sleep and lately has been struggling with elevated blood pressure as well as intractable headaches, she was actually diagnosed with obstructive sleep apnea when she was 30 to 50 pounds heavier back in the early 2000's and was on BiPAP at that time, she lost weight after her bariatric surgery and simply discontinued bilevel therapy as she was very frustrated with it and thought she was getting better.  She notes she is forgetful at times does have nocturia voiding 2-3 times at night, wakes up with a dry mouth.  She denies any abnormal movements in sleep other than being very restless with frequent body position changes, she sleeps with her  and is noted by both him and her family to have irregular breathing pausing at times.  She denies any insomnia but does use her phone right before going to bed but generally will fall asleep within 30 minutes and has only brief awakenings in the middle of the night.  No restless leg symptomatology, no parasomnias.  She does have a bite guard that she wears for

## 2025-03-27 ENCOUNTER — OFFICE VISIT (OUTPATIENT)
Dept: SLEEP MEDICINE | Age: 60
End: 2025-03-27
Payer: COMMERCIAL

## 2025-03-27 VITALS
BODY MASS INDEX: 28.49 KG/M2 | RESPIRATION RATE: 14 BRPM | WEIGHT: 171 LBS | SYSTOLIC BLOOD PRESSURE: 122 MMHG | OXYGEN SATURATION: 100 % | HEIGHT: 65 IN | DIASTOLIC BLOOD PRESSURE: 78 MMHG | HEART RATE: 71 BPM

## 2025-03-27 DIAGNOSIS — G47.33 OSA (OBSTRUCTIVE SLEEP APNEA): Primary | ICD-10-CM

## 2025-03-27 DIAGNOSIS — G47.34 NOCTURNAL HYPOXEMIA: ICD-10-CM

## 2025-03-27 PROCEDURE — 99214 OFFICE O/P EST MOD 30 MIN: CPT | Performed by: INTERNAL MEDICINE

## 2025-03-27 PROCEDURE — 3078F DIAST BP <80 MM HG: CPT | Performed by: INTERNAL MEDICINE

## 2025-03-27 PROCEDURE — 3074F SYST BP LT 130 MM HG: CPT | Performed by: INTERNAL MEDICINE

## 2025-03-27 PROCEDURE — 95976 ALYS SMPL CN NPGT PRGRMG: CPT | Performed by: INTERNAL MEDICINE

## 2025-03-27 ASSESSMENT — SLEEP AND FATIGUE QUESTIONNAIRES
ESS TOTAL SCORE: 1
HOW LIKELY ARE YOU TO NOD OFF OR FALL ASLEEP WHEN YOU ARE A PASSENGER IN A CAR FOR AN HOUR WITHOUT A BREAK: WOULD NEVER DOZE
HOW LIKELY ARE YOU TO NOD OFF OR FALL ASLEEP IN A CAR, WHILE STOPPED FOR A FEW MINUTES IN TRAFFIC: WOULD NEVER DOZE
HOW LIKELY ARE YOU TO NOD OFF OR FALL ASLEEP WHILE WATCHING TV: WOULD NEVER DOZE
HOW LIKELY ARE YOU TO NOD OFF OR FALL ASLEEP WHILE SITTING INACTIVE IN A PUBLIC PLACE: WOULD NEVER DOZE
HOW LIKELY ARE YOU TO NOD OFF OR FALL ASLEEP WHILE SITTING QUIETLY AFTER LUNCH WITHOUT ALCOHOL: WOULD NEVER DOZE
HOW LIKELY ARE YOU TO NOD OFF OR FALL ASLEEP WHILE LYING DOWN TO REST IN THE AFTERNOON WHEN CIRCUMSTANCES PERMIT: SLIGHT CHANCE OF DOZING
HOW LIKELY ARE YOU TO NOD OFF OR FALL ASLEEP WHILE SITTING AND TALKING TO SOMEONE: WOULD NEVER DOZE
HOW LIKELY ARE YOU TO NOD OFF OR FALL ASLEEP WHILE SITTING AND READING: WOULD NEVER DOZE

## 2025-05-07 NOTE — PROGRESS NOTES
(SYNTHROID) 150 MCG tablet Take 1 tablet by mouth Daily    montelukast (SINGULAIR) 10 MG tablet Take 1 tablet by mouth daily    triamcinolone (NASACORT) 55 MCG/ACT nasal inhaler SPRAY 1 SPRAY INTO EACH NOSTRIL EVERY DAY     No current facility-administered medications for this visit.       REVIEW OF SYSTEMS:     CONSTITUTIONAL:   There is no history of fever, chills, night sweats, weight loss, weight gain, persistent fatigue, or lethargy/hypersomnolence.   CARDIAC:   No chest pain, pressure, discomfort, palpitations, orthopnea, murmurs, or edema.   GI:   No dysphagia, heartburn reflux, nausea/vomiting, diarrhea, abdominal pain, or bleeding.   NEURO:   There is no history of AMS, persistent headache, decreased level of consciousness, seizures, or motor or sensory deficits.      PHYSICAL EXAM:    Vitals:    05/08/25 0843   BP: 136/78   Pulse: 93   SpO2: 98%        GENERAL APPEARANCE:   The patient is normal weight and in no respiratory distress.   HEENT:   PERRL.  Conjunctivae unremarkable.   Nasal mucosa is without epistaxis, exudate, or polyps.  Gums and dentition are unremarkable.  There is oropharyngeal narrowing.     NECK/LYMPHATIC:   Symmetrical with no elevation of jugular venous pulsation.  Trachea midline. No thyroid enlargement.  No cervical adenopathy.   LUNGS:   Normal respiratory effort with symmetrical lung expansion.   Breath sounds are clear bilaterally.   HEART:   There is a regular rate and rhythm.  No murmur, rub, or gallop.  There is no edema in the lower extremities.   ABDOMEN:   Soft and non-tender.  No hepatosplenomegaly.  Bowel sounds are normal.     NEURO:   The patient is alert and oriented to person, place, and time.  Memory appears intact and mood is normal.  No gross sensorimotor deficits are present.      ASSESSMENT:  (Medical Decision Making)      Diagnosis Orders   1. YUMIKO (obstructive sleep apnea), this was severe and the patient was treated with BiPAP previously and was intolerant to

## 2025-05-08 ENCOUNTER — OFFICE VISIT (OUTPATIENT)
Dept: SLEEP MEDICINE | Age: 60
End: 2025-05-08

## 2025-05-08 VITALS
WEIGHT: 169 LBS | BODY MASS INDEX: 28.16 KG/M2 | HEART RATE: 93 BPM | OXYGEN SATURATION: 98 % | HEIGHT: 65 IN | DIASTOLIC BLOOD PRESSURE: 78 MMHG | SYSTOLIC BLOOD PRESSURE: 136 MMHG

## 2025-05-08 DIAGNOSIS — G47.33 OSA (OBSTRUCTIVE SLEEP APNEA): Primary | ICD-10-CM

## 2025-05-08 DIAGNOSIS — G47.34 NOCTURNAL HYPOXEMIA: ICD-10-CM

## 2025-05-08 ASSESSMENT — SLEEP AND FATIGUE QUESTIONNAIRES
HOW LIKELY ARE YOU TO NOD OFF OR FALL ASLEEP WHILE SITTING AND TALKING TO SOMEONE: WOULD NEVER DOZE
ESS TOTAL SCORE: 1
HOW LIKELY ARE YOU TO NOD OFF OR FALL ASLEEP IN A CAR, WHILE STOPPED FOR A FEW MINUTES IN TRAFFIC: WOULD NEVER DOZE
HOW LIKELY ARE YOU TO NOD OFF OR FALL ASLEEP WHEN YOU ARE A PASSENGER IN A CAR FOR AN HOUR WITHOUT A BREAK: WOULD NEVER DOZE
HOW LIKELY ARE YOU TO NOD OFF OR FALL ASLEEP WHILE SITTING AND READING: WOULD NEVER DOZE
HOW LIKELY ARE YOU TO NOD OFF OR FALL ASLEEP WHILE LYING DOWN TO REST IN THE AFTERNOON WHEN CIRCUMSTANCES PERMIT: SLIGHT CHANCE OF DOZING
HOW LIKELY ARE YOU TO NOD OFF OR FALL ASLEEP WHILE SITTING QUIETLY AFTER LUNCH WITHOUT ALCOHOL: WOULD NEVER DOZE
HOW LIKELY ARE YOU TO NOD OFF OR FALL ASLEEP WHILE SITTING INACTIVE IN A PUBLIC PLACE: WOULD NEVER DOZE
HOW LIKELY ARE YOU TO NOD OFF OR FALL ASLEEP WHILE WATCHING TV: WOULD NEVER DOZE

## 2025-07-07 NOTE — PROGRESS NOTES
50 MG tablet 2 times daily    traMADol (ULTRAM) 50 MG tablet Take 1 tablet by mouth every 6 hours as needed.    atorvastatin (LIPITOR) 40 MG tablet Take 1 tablet by mouth every morning    fexofenadine (ALLEGRA) 180 MG tablet Take 1 tablet by mouth daily    levothyroxine (SYNTHROID) 150 MCG tablet Take 1 tablet by mouth Daily (Patient not taking: Reported on 7/8/2025)    montelukast (SINGULAIR) 10 MG tablet Take 1 tablet by mouth daily    triamcinolone (NASACORT) 55 MCG/ACT nasal inhaler SPRAY 1 SPRAY INTO EACH NOSTRIL EVERY DAY     No current facility-administered medications for this visit.       REVIEW OF SYSTEMS:     CONSTITUTIONAL:   There is no history of fever, chills, night sweats, weight loss, weight gain, persistent fatigue, or lethargy/hypersomnolence.   CARDIAC:   No chest pain, pressure, discomfort, palpitations, orthopnea, murmurs, or edema.   GI:   No dysphagia, heartburn reflux, nausea/vomiting, diarrhea, abdominal pain, or bleeding.   NEURO:   There is no history of AMS, persistent headache, decreased level of consciousness, seizures, or motor or sensory deficits.      PHYSICAL EXAM:    Vitals:    07/08/25 0857   BP: 128/74   Pulse: 79   Resp: 14   SpO2: 100%          GENERAL APPEARANCE:   The patient is normal weight and in no respiratory distress.   HEENT:   PERRL.  Conjunctivae unremarkable.   Nasal mucosa is without epistaxis, exudate, or polyps.  Gums and dentition are unremarkable.  There is oropharyngeal narrowing.     NECK/LYMPHATIC:   Symmetrical with no elevation of jugular venous pulsation.  Trachea midline. No thyroid enlargement.  No cervical adenopathy.   LUNGS:   Normal respiratory effort with symmetrical lung expansion.   Breath sounds are clear bilaterally.   HEART:   There is a regular rate and rhythm.  No murmur, rub, or gallop.  There is no edema in the lower extremities.   ABDOMEN:   Soft and non-tender.  No hepatosplenomegaly.  Bowel sounds are normal.     NEURO:   The patient

## 2025-07-08 ENCOUNTER — OFFICE VISIT (OUTPATIENT)
Dept: SLEEP MEDICINE | Age: 60
End: 2025-07-08
Payer: COMMERCIAL

## 2025-07-08 VITALS
WEIGHT: 165 LBS | HEIGHT: 65 IN | BODY MASS INDEX: 27.49 KG/M2 | DIASTOLIC BLOOD PRESSURE: 74 MMHG | SYSTOLIC BLOOD PRESSURE: 128 MMHG | RESPIRATION RATE: 14 BRPM | OXYGEN SATURATION: 100 % | HEART RATE: 79 BPM

## 2025-07-08 DIAGNOSIS — G47.33 OSA (OBSTRUCTIVE SLEEP APNEA): Primary | ICD-10-CM

## 2025-07-08 DIAGNOSIS — G47.34 NOCTURNAL HYPOXEMIA: ICD-10-CM

## 2025-07-08 PROCEDURE — 99214 OFFICE O/P EST MOD 30 MIN: CPT | Performed by: INTERNAL MEDICINE

## 2025-07-08 PROCEDURE — 3078F DIAST BP <80 MM HG: CPT | Performed by: INTERNAL MEDICINE

## 2025-07-08 PROCEDURE — 95977 ALYS CPLX CN NPGT PRGRMG: CPT | Performed by: INTERNAL MEDICINE

## 2025-07-08 PROCEDURE — 3074F SYST BP LT 130 MM HG: CPT | Performed by: INTERNAL MEDICINE

## 2025-07-08 ASSESSMENT — SLEEP AND FATIGUE QUESTIONNAIRES
HOW LIKELY ARE YOU TO NOD OFF OR FALL ASLEEP WHILE WATCHING TV: SLIGHT CHANCE OF DOZING
ESS TOTAL SCORE: 5
HOW LIKELY ARE YOU TO NOD OFF OR FALL ASLEEP WHILE LYING DOWN TO REST IN THE AFTERNOON WHEN CIRCUMSTANCES PERMIT: MODERATE CHANCE OF DOZING
HOW LIKELY ARE YOU TO NOD OFF OR FALL ASLEEP WHILE SITTING AND TALKING TO SOMEONE: WOULD NEVER DOZE
HOW LIKELY ARE YOU TO NOD OFF OR FALL ASLEEP WHILE SITTING QUIETLY AFTER LUNCH WITHOUT ALCOHOL: WOULD NEVER DOZE
HOW LIKELY ARE YOU TO NOD OFF OR FALL ASLEEP IN A CAR, WHILE STOPPED FOR A FEW MINUTES IN TRAFFIC: WOULD NEVER DOZE
HOW LIKELY ARE YOU TO NOD OFF OR FALL ASLEEP WHILE SITTING AND READING: WOULD NEVER DOZE
HOW LIKELY ARE YOU TO NOD OFF OR FALL ASLEEP WHILE SITTING INACTIVE IN A PUBLIC PLACE: WOULD NEVER DOZE
HOW LIKELY ARE YOU TO NOD OFF OR FALL ASLEEP WHEN YOU ARE A PASSENGER IN A CAR FOR AN HOUR WITHOUT A BREAK: MODERATE CHANCE OF DOZING

## (undated) DEVICE — CODMAN® DISPOSABLE CATHETER PASSER: Brand: CODMAN®

## (undated) DEVICE — CORD ES L12FT BPLR FRCP

## (undated) DEVICE — NEEDLE HYPO 25GA L1.5IN BLU POLYPR HUB S STL REG BVL STR

## (undated) DEVICE — SPONGE GZ W4XL4IN COT 12 PLY TYP VII WVN C FLD DSGN STERILE

## (undated) DEVICE — SUTURE PERMA-HAND SZ 0 L30IN NONABSORBABLE BLK L36MM CT-1 424H

## (undated) DEVICE — BLADE ES ELASTOMERIC COAT INSUL DURABLE BEND UPTO 90DEG

## (undated) DEVICE — GLOVE ORANGE PI 7 1/2   MSG9075

## (undated) DEVICE — SUTURE NONABSORBABLE MONOFILAMENT 4-0 PS-2 18 IN BLU PROLENE 8682H

## (undated) DEVICE — SUTURE VICRYL + SZ 3-0 L27IN ABSRB UD L26MM SH 1/2 CIR VCP416H

## (undated) DEVICE — TRAY PREP DRY W/ PREM GLV 2 APPL 6 SPNG 2 UNDPD 1 OVERWRAP

## (undated) DEVICE — GARMENT,MEDLINE,DVT,INT,CALF,MED, GEN2: Brand: MEDLINE

## (undated) DEVICE — SKIN PREP TRAY 4 COMPARTM TRAY: Brand: MEDLINE INDUSTRIES, INC.

## (undated) DEVICE — TOWEL SURG W17XL27IN STD BLU COT NONFENESTRATED PREWASHED

## (undated) DEVICE — 48" PROBE COVER W/GEL, ULTRASOUND, STERILE: Brand: SITE-RITE

## (undated) DEVICE — SOLUTION IRRIG 3000ML H2O STRL BAG

## (undated) DEVICE — 3M™ TEGADERM™ TRANSPARENT FILM DRESSING FRAME STYLE, 1624W, 2-3/8 IN X 2-3/4 IN (6 CM X 7 CM), 100/CT 4CT/CASE: Brand: 3M™ TEGADERM™

## (undated) DEVICE — ELECTRODE 8227304 5PK PRASS PR 18MM ROHS

## (undated) DEVICE — SPONGE: SPECIALTY PEANUT XR 100/CS: Brand: MEDICAL ACTION INDUSTRIES

## (undated) DEVICE — 3M™ STERI-DRAPE™ INSTRUMENT POUCH 1018: Brand: STERI-DRAPE™

## (undated) DEVICE — GOWN,REINFORCED,POLY,AURORA,XXLARGE,STR: Brand: MEDLINE

## (undated) DEVICE — BLADE, TONGUE, 6", STERILE: Brand: MEDLINE

## (undated) DEVICE — SUTURE PERMAHAND SZ 2-0 L18IN NONABSORBABLE BLK L26MM SH C012D

## (undated) DEVICE — MARKER SURG SKIN UTIL BLK REG TIP NONSMEARING W/ 6IN RUL

## (undated) DEVICE — SYRINGE MED 30ML STD CLR PLAS LUERLOCK TIP N CTRL DISP

## (undated) DEVICE — DRAPE TWL SURG 16X26IN BLU ORB04] ALLCARE INC]

## (undated) DEVICE — GAUZE,SPONGE,8"X4",12PLY,XRAY,STRL,LF: Brand: MEDLINE

## (undated) DEVICE — KIT,ANTI FOG,W/SPONGE & FLUID,SOFT PACK: Brand: MEDLINE

## (undated) DEVICE — SUTURE PROL SZ 5-0 L18IN NONABSORBABLE BLU L13MM P-3 3/8 8698G

## (undated) DEVICE — NEPTUNE E-SEP SMOKE EVACUATION PENCIL, COATED, 70MM BLADE, PUSH BUTTON SWITCH: Brand: NEPTUNE E-SEP

## (undated) DEVICE — SOLUTION IRRIG 1000ML 0.9% SOD CHL USP POUR PLAS BTL

## (undated) DEVICE — DISPOSABLE STANDARD BIPOLAR FORCEPS, NON-STICK,: Brand: SPETZLER-MALIS

## (undated) DEVICE — COVER,MAYO STAND,STERILE: Brand: MEDLINE

## (undated) DEVICE — LOOP VES W13MM THK09MM MINI RED SIL FLD REPELLENT

## (undated) DEVICE — PACK SURGICAL PROCEDURE KIT CYSTOSCOPY TOTE

## (undated) DEVICE — SPONGE,NEURO,1"X3",XR,STRL,LF,10/PK: Brand: MEDLINE

## (undated) DEVICE — INTENDED FOR TISSUE SEPARATION, AND OTHER PROCEDURES THAT REQUIRE A SHARP SURGICAL BLADE TO PUNCTURE OR CUT.: Brand: BARD-PARKER ® STAINLESS STEEL BLADES

## (undated) DEVICE — CYSTO/BLADDER IRRIGATION SET, REGULATING CLAMP

## (undated) DEVICE — 3M™ IOBAN™ 2 ANTIMICROBIAL INCISE DRAPE 6650EZ: Brand: IOBAN™ 2

## (undated) DEVICE — GOWN,SIRUS,NON REINFRCD,LARGE,SET IN SL: Brand: MEDLINE

## (undated) DEVICE — SUREFIT, DUAL DISPERSIVE ELECTRODE, CONTACT QUALITY MONITOR: Brand: SUREFIT

## (undated) DEVICE — STAPLER, SKIN, 35W, A: Brand: MEDLINE INDUSTRIES, INC.

## (undated) DEVICE — PROBE 8225401 5PK SD-SD BIPOL STIM ROHS

## (undated) DEVICE — Device

## (undated) DEVICE — CATHETER IV 20GA L1IN PINK FEP SFTY STRGHT HUB RDPQUE DSPSBL